# Patient Record
Sex: MALE | Race: WHITE | NOT HISPANIC OR LATINO | Employment: OTHER | URBAN - METROPOLITAN AREA
[De-identification: names, ages, dates, MRNs, and addresses within clinical notes are randomized per-mention and may not be internally consistent; named-entity substitution may affect disease eponyms.]

---

## 2017-02-15 ENCOUNTER — HOSPITAL ENCOUNTER (OUTPATIENT)
Dept: RADIOLOGY | Age: 67
Discharge: HOME | End: 2017-02-15
Payer: COMMERCIAL

## 2017-02-15 ENCOUNTER — TRANSCRIBE ORDERS (OUTPATIENT)
Dept: ADMINISTRATIVE | Age: 67
End: 2017-02-15

## 2017-02-15 ENCOUNTER — OFFICE VISIT (OUTPATIENT)
Dept: LAB | Age: 67
End: 2017-02-15
Payer: COMMERCIAL

## 2017-02-15 DIAGNOSIS — Z01.818 PRE-OPERATIVE GENERAL PHYSICAL EXAMINATION: Primary | ICD-10-CM

## 2017-02-15 DIAGNOSIS — Z01.818 PRE-OPERATIVE GENERAL PHYSICAL EXAMINATION: ICD-10-CM

## 2017-02-15 DIAGNOSIS — J18.9 UNRESOLVED PNEUMONIA: ICD-10-CM

## 2017-02-15 DIAGNOSIS — J18.9 UNRESOLVED PNEUMONIA: Primary | ICD-10-CM

## 2017-02-15 LAB
ATRIAL RATE: 211 BPM
QRS AXIS: 131 DEGREES
QRSD INTERVAL: 94 MS
QT INTERVAL: 202 MS
QTC INTERVAL: 378 MS
T WAVE AXIS: 127 DEGREES
VENTRICULAR RATE: 211 BPM

## 2017-02-15 PROCEDURE — 93005 ELECTROCARDIOGRAM TRACING: CPT

## 2017-02-15 PROCEDURE — 71020 HB CHEST X-RAY 2VW FRONTAL&LATL: CPT

## 2017-10-06 ENCOUNTER — APPOINTMENT (OUTPATIENT)
Dept: AUDIOLOGY | Age: 67
End: 2017-10-06
Payer: COMMERCIAL

## 2017-10-06 PROCEDURE — 92555 SPEECH THRESHOLD AUDIOMETRY: CPT | Performed by: AUDIOLOGIST

## 2017-10-06 PROCEDURE — 92567 TYMPANOMETRY: CPT | Performed by: AUDIOLOGIST

## 2017-10-06 PROCEDURE — 92582 CONDITIONING PLAY AUDIOMETRY: CPT | Performed by: AUDIOLOGIST

## 2018-01-18 ENCOUNTER — HOSPITAL ENCOUNTER (INPATIENT)
Facility: HOSPITAL | Age: 68
LOS: 3 days | Discharge: HOME HEALTH | DRG: 243 | End: 2018-01-21
Attending: INTERNAL MEDICINE | Admitting: INTERNAL MEDICINE
Payer: COMMERCIAL

## 2018-01-18 ENCOUNTER — APPOINTMENT (EMERGENCY)
Dept: RADIOLOGY | Facility: HOSPITAL | Age: 68
End: 2018-01-18
Payer: COMMERCIAL

## 2018-01-18 ENCOUNTER — HOSPITAL ENCOUNTER (EMERGENCY)
Facility: HOSPITAL | Age: 68
End: 2018-01-18
Admitting: EMERGENCY MEDICINE
Payer: COMMERCIAL

## 2018-01-18 VITALS
RESPIRATION RATE: 18 BRPM | OXYGEN SATURATION: 99 % | TEMPERATURE: 97.5 F | SYSTOLIC BLOOD PRESSURE: 135 MMHG | WEIGHT: 121 LBS | BODY MASS INDEX: 23.63 KG/M2 | DIASTOLIC BLOOD PRESSURE: 79 MMHG | HEART RATE: 28 BPM

## 2018-01-18 DIAGNOSIS — I44.2 THIRD DEGREE HEART BLOCK (HCC): Primary | ICD-10-CM

## 2018-01-18 DIAGNOSIS — S20.212A CHEST WALL CONTUSION, LEFT, INITIAL ENCOUNTER: ICD-10-CM

## 2018-01-18 DIAGNOSIS — R55 NEAR SYNCOPE: ICD-10-CM

## 2018-01-18 DIAGNOSIS — R53.1 ASTHENIA: ICD-10-CM

## 2018-01-18 LAB
ANION GAP SERPL CALCULATED.3IONS-SCNC: 7 MMOL/L (ref 4–13)
APTT PPP: 30 SECONDS (ref 23–35)
BASOPHILS # BLD AUTO: 0.03 THOUSANDS/ΜL (ref 0–0.1)
BASOPHILS NFR BLD AUTO: 0 % (ref 0–1)
BUN SERPL-MCNC: 28 MG/DL (ref 5–25)
CALCIUM SERPL-MCNC: 9 MG/DL (ref 8.3–10.1)
CHLORIDE SERPL-SCNC: 107 MMOL/L (ref 100–108)
CO2 SERPL-SCNC: 31 MMOL/L (ref 21–32)
CREAT SERPL-MCNC: 1.4 MG/DL (ref 0.6–1.3)
EOSINOPHIL # BLD AUTO: 0.34 THOUSAND/ΜL (ref 0–0.61)
EOSINOPHIL NFR BLD AUTO: 5 % (ref 0–6)
ERYTHROCYTE [DISTWIDTH] IN BLOOD BY AUTOMATED COUNT: 12.6 % (ref 11.6–15.1)
GFR SERPL CREATININE-BSD FRML MDRD: 52 ML/MIN/1.73SQ M
GLUCOSE SERPL-MCNC: 95 MG/DL (ref 65–140)
HCT VFR BLD AUTO: 45.5 % (ref 36.5–49.3)
HGB BLD-MCNC: 15.1 G/DL (ref 12–17)
INR PPP: 1.01 (ref 0.86–1.16)
LYMPHOCYTES # BLD AUTO: 3.58 THOUSANDS/ΜL (ref 0.6–4.47)
LYMPHOCYTES NFR BLD AUTO: 49 % (ref 14–44)
MAGNESIUM SERPL-MCNC: 2.1 MG/DL (ref 1.6–2.6)
MCH RBC QN AUTO: 30.6 PG (ref 26.8–34.3)
MCHC RBC AUTO-ENTMCNC: 33.2 G/DL (ref 31.4–37.4)
MCV RBC AUTO: 92 FL (ref 82–98)
MONOCYTES # BLD AUTO: 0.61 THOUSAND/ΜL (ref 0.17–1.22)
MONOCYTES NFR BLD AUTO: 9 % (ref 4–12)
NEUTROPHILS # BLD AUTO: 2.62 THOUSANDS/ΜL (ref 1.85–7.62)
NEUTS SEG NFR BLD AUTO: 37 % (ref 43–75)
PLATELET # BLD AUTO: 204 THOUSANDS/UL (ref 149–390)
PMV BLD AUTO: 9.3 FL (ref 8.9–12.7)
POTASSIUM SERPL-SCNC: 4.2 MMOL/L (ref 3.5–5.3)
PROTHROMBIN TIME: 13.6 SECONDS (ref 12.1–14.4)
RBC # BLD AUTO: 4.93 MILLION/UL (ref 3.88–5.62)
SODIUM SERPL-SCNC: 145 MMOL/L (ref 136–145)
TROPONIN I SERPL-MCNC: <0.02 NG/ML
TSH SERPL DL<=0.05 MIU/L-ACNC: 3.92 UIU/ML (ref 0.36–3.74)
WBC # BLD AUTO: 7.18 THOUSAND/UL (ref 4.31–10.16)

## 2018-01-18 PROCEDURE — 80048 BASIC METABOLIC PNL TOTAL CA: CPT | Performed by: PHYSICIAN ASSISTANT

## 2018-01-18 PROCEDURE — 84484 ASSAY OF TROPONIN QUANT: CPT | Performed by: PHYSICIAN ASSISTANT

## 2018-01-18 PROCEDURE — 83735 ASSAY OF MAGNESIUM: CPT | Performed by: PHYSICIAN ASSISTANT

## 2018-01-18 PROCEDURE — 84443 ASSAY THYROID STIM HORMONE: CPT | Performed by: PHYSICIAN ASSISTANT

## 2018-01-18 PROCEDURE — 85025 COMPLETE CBC W/AUTO DIFF WBC: CPT | Performed by: PHYSICIAN ASSISTANT

## 2018-01-18 PROCEDURE — 85730 THROMBOPLASTIN TIME PARTIAL: CPT | Performed by: PHYSICIAN ASSISTANT

## 2018-01-18 PROCEDURE — 93005 ELECTROCARDIOGRAM TRACING: CPT

## 2018-01-18 PROCEDURE — 85610 PROTHROMBIN TIME: CPT | Performed by: PHYSICIAN ASSISTANT

## 2018-01-18 PROCEDURE — 93005 ELECTROCARDIOGRAM TRACING: CPT | Performed by: PHYSICIAN ASSISTANT

## 2018-01-18 PROCEDURE — 96360 HYDRATION IV INFUSION INIT: CPT

## 2018-01-18 PROCEDURE — 36415 COLL VENOUS BLD VENIPUNCTURE: CPT | Performed by: PHYSICIAN ASSISTANT

## 2018-01-18 PROCEDURE — 99285 EMERGENCY DEPT VISIT HI MDM: CPT

## 2018-01-18 PROCEDURE — 71045 X-RAY EXAM CHEST 1 VIEW: CPT

## 2018-01-18 RX ORDER — CITALOPRAM 20 MG/1
20 TABLET ORAL DAILY
COMMUNITY

## 2018-01-18 RX ORDER — ACETAMINOPHEN 325 MG/1
325 TABLET ORAL EVERY 6 HOURS PRN
COMMUNITY

## 2018-01-18 RX ORDER — CETIRIZINE HYDROCHLORIDE 10 MG/1
10 TABLET ORAL DAILY
COMMUNITY
End: 2022-01-05

## 2018-01-18 RX ORDER — CHLORHEXIDINE GLUCONATE 0.12 MG/ML
15 RINSE ORAL EVERY 12 HOURS SCHEDULED
Status: DISCONTINUED | OUTPATIENT
Start: 2018-01-19 | End: 2018-01-19

## 2018-01-18 RX ORDER — RISPERIDONE 1 MG/1
1 TABLET, FILM COATED ORAL 2 TIMES DAILY
Status: DISCONTINUED | OUTPATIENT
Start: 2018-01-19 | End: 2018-01-21 | Stop reason: HOSPADM

## 2018-01-18 RX ORDER — CITALOPRAM 10 MG/1
10 TABLET ORAL DAILY
Status: DISCONTINUED | OUTPATIENT
Start: 2018-01-19 | End: 2018-01-21 | Stop reason: HOSPADM

## 2018-01-18 RX ORDER — TERAZOSIN 5 MG/1
5 CAPSULE ORAL
COMMUNITY

## 2018-01-18 RX ORDER — OXYBUTYNIN CHLORIDE 5 MG/1
5 TABLET ORAL 2 TIMES DAILY
COMMUNITY

## 2018-01-18 RX ORDER — CALCIUM POLYCARBOPHIL 625 MG 625 MG/1
625 TABLET ORAL DAILY
COMMUNITY

## 2018-01-18 RX ORDER — LOPERAMIDE HYDROCHLORIDE 2 MG/1
TABLET ORAL 4 TIMES DAILY PRN
COMMUNITY

## 2018-01-18 RX ORDER — RISPERIDONE 1 MG/1
0.5 TABLET, FILM COATED ORAL 2 TIMES DAILY
COMMUNITY

## 2018-01-18 RX ADMIN — SODIUM CHLORIDE 500 ML: 0.9 INJECTION, SOLUTION INTRAVENOUS at 21:14

## 2018-01-19 ENCOUNTER — APPOINTMENT (INPATIENT)
Dept: NON INVASIVE DIAGNOSTICS | Facility: HOSPITAL | Age: 68
DRG: 243 | End: 2018-01-19
Payer: COMMERCIAL

## 2018-01-19 ENCOUNTER — APPOINTMENT (INPATIENT)
Dept: RADIOLOGY | Facility: HOSPITAL | Age: 68
DRG: 243 | End: 2018-01-19
Payer: COMMERCIAL

## 2018-01-19 PROBLEM — F79 MR (MENTAL RETARDATION): Status: ACTIVE | Noted: 2018-01-19

## 2018-01-19 PROBLEM — S20.219A CHEST WALL CONTUSION: Status: ACTIVE | Noted: 2018-01-19

## 2018-01-19 LAB
ABO GROUP BLD: NORMAL
ANION GAP SERPL CALCULATED.3IONS-SCNC: 8 MMOL/L (ref 4–13)
APTT PPP: 32 SECONDS (ref 23–35)
ATRIAL RATE: 37 BPM
ATRIAL RATE: 47 BPM
BASOPHILS # BLD AUTO: 0.02 THOUSANDS/ΜL (ref 0–0.1)
BASOPHILS NFR BLD AUTO: 0 % (ref 0–1)
BLD GP AB SCN SERPL QL: NEGATIVE
BUN SERPL-MCNC: 22 MG/DL (ref 5–25)
CALCIUM SERPL-MCNC: 8.8 MG/DL (ref 8.3–10.1)
CHLORIDE SERPL-SCNC: 108 MMOL/L (ref 100–108)
CO2 SERPL-SCNC: 25 MMOL/L (ref 21–32)
CREAT SERPL-MCNC: 1.05 MG/DL (ref 0.6–1.3)
EOSINOPHIL # BLD AUTO: 0.23 THOUSAND/ΜL (ref 0–0.61)
EOSINOPHIL NFR BLD AUTO: 3 % (ref 0–6)
ERYTHROCYTE [DISTWIDTH] IN BLOOD BY AUTOMATED COUNT: 12.6 % (ref 11.6–15.1)
GFR SERPL CREATININE-BSD FRML MDRD: 74 ML/MIN/1.73SQ M
GLUCOSE SERPL-MCNC: 85 MG/DL (ref 65–140)
HCT VFR BLD AUTO: 44.6 % (ref 36.5–49.3)
HGB BLD-MCNC: 15.6 G/DL (ref 12–17)
INR PPP: 1.12 (ref 0.86–1.16)
LYMPHOCYTES # BLD AUTO: 2.49 THOUSANDS/ΜL (ref 0.6–4.47)
LYMPHOCYTES NFR BLD AUTO: 33 % (ref 14–44)
MAGNESIUM SERPL-MCNC: 2.2 MG/DL (ref 1.6–2.6)
MCH RBC QN AUTO: 31 PG (ref 26.8–34.3)
MCHC RBC AUTO-ENTMCNC: 35 G/DL (ref 31.4–37.4)
MCV RBC AUTO: 89 FL (ref 82–98)
MONOCYTES # BLD AUTO: 0.67 THOUSAND/ΜL (ref 0.17–1.22)
MONOCYTES NFR BLD AUTO: 9 % (ref 4–12)
NEUTROPHILS # BLD AUTO: 4.24 THOUSANDS/ΜL (ref 1.85–7.62)
NEUTS SEG NFR BLD AUTO: 55 % (ref 43–75)
NRBC BLD AUTO-RTO: 0 /100 WBCS
PHOSPHATE SERPL-MCNC: 3.4 MG/DL (ref 2.3–4.1)
PLATELET # BLD AUTO: 207 THOUSANDS/UL (ref 149–390)
PMV BLD AUTO: 9.6 FL (ref 8.9–12.7)
POTASSIUM SERPL-SCNC: 3.8 MMOL/L (ref 3.5–5.3)
PROTHROMBIN TIME: 14.4 SECONDS (ref 12.1–14.4)
QRS AXIS: 140 DEGREES
QRS AXIS: 64 DEGREES
QRSD INTERVAL: 120 MS
QRSD INTERVAL: 133 MS
QT INTERVAL: 658 MS
QT INTERVAL: 706 MS
QTC INTERVAL: 517 MS
QTC INTERVAL: 554 MS
RBC # BLD AUTO: 5.03 MILLION/UL (ref 3.88–5.62)
RH BLD: POSITIVE
SODIUM SERPL-SCNC: 141 MMOL/L (ref 136–145)
SPECIMEN EXPIRATION DATE: NORMAL
T WAVE AXIS: 43 DEGREES
T WAVE AXIS: 65 DEGREES
VENTRICULAR RATE: 37 BPM
VENTRICULAR RATE: 37 BPM
WBC # BLD AUTO: 7.66 THOUSAND/UL (ref 4.31–10.16)

## 2018-01-19 PROCEDURE — 80048 BASIC METABOLIC PNL TOTAL CA: CPT | Performed by: PHYSICIAN ASSISTANT

## 2018-01-19 PROCEDURE — C1769 GUIDE WIRE: HCPCS | Performed by: INTERNAL MEDICINE

## 2018-01-19 PROCEDURE — 93005 ELECTROCARDIOGRAM TRACING: CPT

## 2018-01-19 PROCEDURE — C1785 PMKR, DUAL, RATE-RESP: HCPCS

## 2018-01-19 PROCEDURE — 85730 THROMBOPLASTIN TIME PARTIAL: CPT | Performed by: PHYSICIAN ASSISTANT

## 2018-01-19 PROCEDURE — 93306 TTE W/DOPPLER COMPLETE: CPT

## 2018-01-19 PROCEDURE — 33208 INSRT HEART PM ATRIAL & VENT: CPT | Performed by: INTERNAL MEDICINE

## 2018-01-19 PROCEDURE — 02HK3JZ INSERTION OF PACEMAKER LEAD INTO RIGHT VENTRICLE, PERCUTANEOUS APPROACH: ICD-10-PCS | Performed by: INTERNAL MEDICINE

## 2018-01-19 PROCEDURE — 85025 COMPLETE CBC W/AUTO DIFF WBC: CPT | Performed by: PHYSICIAN ASSISTANT

## 2018-01-19 PROCEDURE — 83735 ASSAY OF MAGNESIUM: CPT | Performed by: PHYSICIAN ASSISTANT

## 2018-01-19 PROCEDURE — 86850 RBC ANTIBODY SCREEN: CPT | Performed by: PHYSICIAN ASSISTANT

## 2018-01-19 PROCEDURE — C1898 LEAD, PMKR, OTHER THAN TRANS: HCPCS

## 2018-01-19 PROCEDURE — 02H63JZ INSERTION OF PACEMAKER LEAD INTO RIGHT ATRIUM, PERCUTANEOUS APPROACH: ICD-10-PCS | Performed by: INTERNAL MEDICINE

## 2018-01-19 PROCEDURE — 85610 PROTHROMBIN TIME: CPT | Performed by: PHYSICIAN ASSISTANT

## 2018-01-19 PROCEDURE — 86618 LYME DISEASE ANTIBODY: CPT | Performed by: PHYSICIAN ASSISTANT

## 2018-01-19 PROCEDURE — 71045 X-RAY EXAM CHEST 1 VIEW: CPT

## 2018-01-19 PROCEDURE — 84100 ASSAY OF PHOSPHORUS: CPT | Performed by: PHYSICIAN ASSISTANT

## 2018-01-19 PROCEDURE — C1892 INTRO/SHEATH,FIXED,PEEL-AWAY: HCPCS | Performed by: INTERNAL MEDICINE

## 2018-01-19 PROCEDURE — 86900 BLOOD TYPING SEROLOGIC ABO: CPT | Performed by: PHYSICIAN ASSISTANT

## 2018-01-19 PROCEDURE — 86901 BLOOD TYPING SEROLOGIC RH(D): CPT | Performed by: PHYSICIAN ASSISTANT

## 2018-01-19 PROCEDURE — 0JH606Z INSERTION OF PACEMAKER, DUAL CHAMBER INTO CHEST SUBCUTANEOUS TISSUE AND FASCIA, OPEN APPROACH: ICD-10-PCS | Performed by: INTERNAL MEDICINE

## 2018-01-19 PROCEDURE — 86617 LYME DISEASE ANTIBODY: CPT | Performed by: PHYSICIAN ASSISTANT

## 2018-01-19 RX ORDER — FENTANYL CITRATE 50 UG/ML
INJECTION, SOLUTION INTRAMUSCULAR; INTRAVENOUS AS NEEDED
Status: DISCONTINUED | OUTPATIENT
Start: 2018-01-19 | End: 2018-01-19 | Stop reason: SURG

## 2018-01-19 RX ORDER — FENTANYL CITRATE/PF 50 MCG/ML
25 SYRINGE (ML) INJECTION
Status: DISCONTINUED | OUTPATIENT
Start: 2018-01-19 | End: 2018-01-20

## 2018-01-19 RX ORDER — METOCLOPRAMIDE HYDROCHLORIDE 5 MG/ML
10 INJECTION INTRAMUSCULAR; INTRAVENOUS ONCE AS NEEDED
Status: DISCONTINUED | OUTPATIENT
Start: 2018-01-19 | End: 2018-01-21 | Stop reason: HOSPADM

## 2018-01-19 RX ORDER — ONDANSETRON 2 MG/ML
4 INJECTION INTRAMUSCULAR; INTRAVENOUS ONCE AS NEEDED
Status: DISCONTINUED | OUTPATIENT
Start: 2018-01-19 | End: 2018-01-20

## 2018-01-19 RX ORDER — ACETAMINOPHEN 325 MG/1
650 TABLET ORAL EVERY 4 HOURS PRN
Status: DISCONTINUED | OUTPATIENT
Start: 2018-01-19 | End: 2018-01-19

## 2018-01-19 RX ORDER — POTASSIUM CHLORIDE 14.9 MG/ML
20 INJECTION INTRAVENOUS ONCE
Status: COMPLETED | OUTPATIENT
Start: 2018-01-19 | End: 2018-01-19

## 2018-01-19 RX ORDER — SODIUM CHLORIDE 9 MG/ML
INJECTION, SOLUTION INTRAVENOUS CONTINUOUS PRN
Status: DISCONTINUED | OUTPATIENT
Start: 2018-01-19 | End: 2018-01-19 | Stop reason: SURG

## 2018-01-19 RX ORDER — LIDOCAINE HYDROCHLORIDE 10 MG/ML
INJECTION, SOLUTION INFILTRATION; PERINEURAL CODE/TRAUMA/SEDATION MEDICATION
Status: COMPLETED | OUTPATIENT
Start: 2018-01-19 | End: 2018-01-19

## 2018-01-19 RX ORDER — OXYCODONE HYDROCHLORIDE AND ACETAMINOPHEN 5; 325 MG/1; MG/1
1 TABLET ORAL EVERY 4 HOURS PRN
Status: DISCONTINUED | OUTPATIENT
Start: 2018-01-19 | End: 2018-01-21 | Stop reason: HOSPADM

## 2018-01-19 RX ORDER — GLYCOPYRROLATE 0.2 MG/ML
INJECTION INTRAMUSCULAR; INTRAVENOUS AS NEEDED
Status: DISCONTINUED | OUTPATIENT
Start: 2018-01-19 | End: 2018-01-19 | Stop reason: SURG

## 2018-01-19 RX ORDER — SODIUM CHLORIDE, SODIUM LACTATE, POTASSIUM CHLORIDE, CALCIUM CHLORIDE 600; 310; 30; 20 MG/100ML; MG/100ML; MG/100ML; MG/100ML
100 INJECTION, SOLUTION INTRAVENOUS CONTINUOUS
Status: DISCONTINUED | OUTPATIENT
Start: 2018-01-19 | End: 2018-01-19

## 2018-01-19 RX ORDER — PROPOFOL 10 MG/ML
INJECTION, EMULSION INTRAVENOUS AS NEEDED
Status: DISCONTINUED | OUTPATIENT
Start: 2018-01-19 | End: 2018-01-19

## 2018-01-19 RX ORDER — PROMETHAZINE HYDROCHLORIDE 25 MG/ML
12.5 INJECTION, SOLUTION INTRAMUSCULAR; INTRAVENOUS ONCE AS NEEDED
Status: DISCONTINUED | OUTPATIENT
Start: 2018-01-19 | End: 2018-01-20

## 2018-01-19 RX ORDER — MIDAZOLAM HYDROCHLORIDE 1 MG/ML
INJECTION INTRAMUSCULAR; INTRAVENOUS AS NEEDED
Status: DISCONTINUED | OUTPATIENT
Start: 2018-01-19 | End: 2018-01-19 | Stop reason: SURG

## 2018-01-19 RX ORDER — POTASSIUM CHLORIDE 20 MEQ/1
20 TABLET, EXTENDED RELEASE ORAL ONCE
Status: COMPLETED | OUTPATIENT
Start: 2018-01-19 | End: 2018-01-19

## 2018-01-19 RX ORDER — ACETAMINOPHEN 325 MG/1
650 TABLET ORAL EVERY 6 HOURS PRN
Status: DISCONTINUED | OUTPATIENT
Start: 2018-01-19 | End: 2018-01-21 | Stop reason: HOSPADM

## 2018-01-19 RX ORDER — ACETAMINOPHEN 325 MG/1
650 TABLET ORAL EVERY 6 HOURS PRN
Status: DISCONTINUED | OUTPATIENT
Start: 2018-01-19 | End: 2018-01-19

## 2018-01-19 RX ORDER — POTASSIUM CHLORIDE 29.8 MG/ML
40 INJECTION INTRAVENOUS ONCE
Status: DISCONTINUED | OUTPATIENT
Start: 2018-01-19 | End: 2018-01-19

## 2018-01-19 RX ORDER — METOPROLOL TARTRATE 5 MG/5ML
INJECTION INTRAVENOUS AS NEEDED
Status: DISCONTINUED | OUTPATIENT
Start: 2018-01-19 | End: 2018-01-19 | Stop reason: SURG

## 2018-01-19 RX ORDER — GENTAMICIN SULFATE 40 MG/ML
INJECTION, SOLUTION INTRAMUSCULAR; INTRAVENOUS CODE/TRAUMA/SEDATION MEDICATION
Status: COMPLETED | OUTPATIENT
Start: 2018-01-19 | End: 2018-01-19

## 2018-01-19 RX ADMIN — RISPERIDONE 1 MG: 1 TABLET ORAL at 17:42

## 2018-01-19 RX ADMIN — METOPROLOL TARTRATE 3 MG: 1 INJECTION, SOLUTION INTRAVENOUS at 20:03

## 2018-01-19 RX ADMIN — METOPROLOL TARTRATE 2 MG: 1 INJECTION, SOLUTION INTRAVENOUS at 19:55

## 2018-01-19 RX ADMIN — POTASSIUM CHLORIDE 20 MEQ: 1500 TABLET, EXTENDED RELEASE ORAL at 11:16

## 2018-01-19 RX ADMIN — FENTANYL CITRATE 50 MCG: 50 INJECTION, SOLUTION INTRAMUSCULAR; INTRAVENOUS at 19:06

## 2018-01-19 RX ADMIN — ACETAMINOPHEN 650 MG: 325 TABLET, FILM COATED ORAL at 14:48

## 2018-01-19 RX ADMIN — GLYCOPYRROLATE 0.2 MG: 0.2 INJECTION, SOLUTION INTRAMUSCULAR; INTRAVENOUS at 19:23

## 2018-01-19 RX ADMIN — LIDOCAINE HYDROCHLORIDE 20 ML: 10 INJECTION, SOLUTION INFILTRATION; PERINEURAL at 19:19

## 2018-01-19 RX ADMIN — RISPERIDONE 1 MG: 1 TABLET ORAL at 08:05

## 2018-01-19 RX ADMIN — MIDAZOLAM HYDROCHLORIDE 2 MG: 1 INJECTION, SOLUTION INTRAMUSCULAR; INTRAVENOUS at 18:58

## 2018-01-19 RX ADMIN — SODIUM CHLORIDE: 0.9 INJECTION, SOLUTION INTRAVENOUS at 18:29

## 2018-01-19 RX ADMIN — CEFAZOLIN SODIUM 1000 MG: 1 SOLUTION INTRAVENOUS at 19:06

## 2018-01-19 RX ADMIN — CHLORHEXIDINE GLUCONATE 15 ML: 1.2 RINSE ORAL at 20:41

## 2018-01-19 RX ADMIN — POTASSIUM CHLORIDE 20 MEQ: 200 INJECTION, SOLUTION INTRAVENOUS at 11:16

## 2018-01-19 RX ADMIN — CITALOPRAM HYDROBROMIDE 10 MG: 10 TABLET ORAL at 08:05

## 2018-01-19 RX ADMIN — CHLORHEXIDINE GLUCONATE 15 ML: 1.2 RINSE ORAL at 08:06

## 2018-01-19 RX ADMIN — GENTAMICIN SULFATE 80 MG: 40 INJECTION, SOLUTION INTRAMUSCULAR; INTRAVENOUS at 19:40

## 2018-01-19 RX ADMIN — ENOXAPARIN SODIUM 40 MG: 40 INJECTION SUBCUTANEOUS at 20:41

## 2018-01-19 NOTE — CASE MANAGEMENT
Thank you,  [facility] in the [facility] by Chris Mendoza for 2017  Network Utilization Review Department  Phone: [phone]; Fax [phone]  ATTENTION: The Network Utilization Review Department is now centralized for our 7 Facilities  Make a note that we have a new phone and fax numbers for our Department  Please call with any questions or concerns to [phone] and carefully follow the prompts so that you are directed to the right person  All voicemails are confidential  Fax any determinations, approvals, denials, and requests for initial or continue stay review clinical to [phone]  Due to HIGH CALL volume, it would be easier if you could please send faxed requests to expedite your requests and in part, help us provide discharge notifications faster  Initial Clinical Review    Admission: Date/Time/Statement: 1/18/18 AT 2349 URGENT INPATIENT TRANSFER FROM [facility] TO [facility] 2ND FALL WITH BRADYCARDIA 30'S /COMPLETE HEART BLOCK WITH JUNCTIONAL ESCAPE RHYTHYM FOR CARDIAC MANAGEMENT + PACEMAKER INSERTION      Orders Placed This Encounter   Procedures    Inpatient Admission     Standing Status:   Standing     Number of Occurrences:   1     Order Specific Question:   Admitting Physician     Answer:   Veverly Good [[id number]]     Order Specific Question:   Level of Care     Answer:   Critical Care [15]     Order Specific Question:   Estimated length of stay     Answer:   More than 2 Midnights     Order Specific Question:   Certification     Answer:   I certify that inpatient services are medically necessary for this patient for a duration of greater than two midnights  See H&P and MD Progress Notes for additional information about the patient's course of treatment         Date/Time/Mode of Arrival:     Reason for Admission / Chief Complaint: 3rd Degree heart block     History of Present Illness:  Modesta Cooper is a 78 y o  male who presents with his sister, transferred from [facility] ED for 3rd degree heart block noted upon evaluation s/p unwitnessed fall earlier today  PMHx is most significant for MR/ developmental delay, otherwise as stated below      Patients sister states that he was in his normal state of health, at his group home, and was in the shower by himself (normally takes a shower on his own) and fell  It was unknown whether it was syncope (LOC?) or a mechanical fall but patient was conscious and acting appropriately after the incident       His sister states that he falls frequently and she thought that this incident was pretty standard for him but when he was evaluated in the ED and she was told that he had a very low heart rate, she was shocked and states that this has never been brought to her attention before      No new medications, no known exposures to ticks       Patient was evaluated in the ED at [facility] and was transferred to [facility] for cardiology evaluation and pacemaker placement  Throughout his ED stay and upon arrival to the ICU, patient has been asymptomatic and at his baseline per patients sister      History obtained from sibling  Review of Systems   Unable to perform ROS: Patient nonverbal         Physical Exam:  Constitutional: He appears well-developed  No distress  Cardiovascular: Intact distal pulses  Bradycardia present  Pulmonary/Chest: Effort normal and breath sounds normal  No accessory muscle usage or stridor  No respiratory distress  He has no decreased breath sounds  He has no wheezes  He has no rhonchi  He has no rales         ED Vital Signs:   ED Triage Vitals [01/18/18 2300]   Temperature Pulse Respirations Blood Pressure SpO2   98 1 °F (36 7 °C) (!) 31 20 154/78 100 %      Temp Source Heart Rate Source Patient Position - Orthostatic VS BP Location FiO2 (%)   Oral Monitor Lying Right arm --      Pain Score       --        Wt Readings from Last 1 Encounters:   01/19/18 54 5 kg (120 lb 2 4 oz) 01/18 0701  01/19 0700 01/19 0701  01/19 1435  Most Recent    Temperature (°F) 97 5-98 4 98 6  98 6 (37)    Pulse 26-39 22-40  22    Respirations 12-23 18-24  18    Blood Pressure 113//67 100//64  107/67    SpO2 (%)    99          LABS/Diagnostic Test Results:   CBC  Results from last 7 days  Lab Units 01/19/18  0519 01/18/18  2109   WBC Thousand/uL 7 66 7 18   HEMOGLOBIN g/dL 15 6 15 1   HEMATOCRIT % 44 6 45 5   PLATELETS Thousands/uL 207 204   NEUTROS PCT % 55 37*   MONOS PCT % 9 9     CMP  Results from last 7 days  Lab Units 01/19/18  0519 01/18/18  2109   SODIUM mmol/L 141 145   POTASSIUM mmol/L 3 8 4 2   CHLORIDE mmol/L 108 107   CO2 mmol/L 25 31   BUN mg/dL 22 28*   CREATININE mg/dL 1 05 1 40*   CALCIUM mg/dL 8 8 9 0   GLUCOSE RANDOM mg/dL 85 95      Lab Units 01/19/18  0519 01/18/18  2109   MAGNESIUM mg/dL 2 2 2 1            Lab Results   Component Value Date     PHOS 3 4 01/19/2018      Lab Units 01/19/18  0519 01/18/18 2109   INR   1 12 1 01   PTT seconds 32 30      Lab Value Date/Time   TROPONINI <0 02 01/18/2018 2109       EKG (per Card Consult ):   Sinus tachycardia with junctional escape rhythm  RBBB at baseline      Past Medical/Surgical History:   Past Medical History:   Diagnosis Date    Developmental non-verbal disorder     Dysphagia     Gait disturbance     Hearing loss     Intellectual disability     Osteoarthritis     Osteoporosis     Prostate atrophy        Admitting Diagnosis: Third degree heart block (HCC) [I44 2]    Age/Sex: 78 y o  male    Medications  Medications   sodium chloride 0 9 % bolus 500 mL (0 mL Intravenous Stopped 1/18/18 2202)           Assessment and Plan:   Principal Problem: Third degree heart block (HCC)  Active Problems:    MR (mental retardation)    Chest wall contusion    Neuro: At baseline MR per sister  Cont neuro checks  [place]      CV: 3rd degree heart block  For pacemaker today    Pacer pads in place      Pulm: no acute pulm issues at this time     GI: npo  Advance diet after procedure     : monitor I/o's  Replace potassium     ID: no infxn at this time       Heme: stable at this time  Monitor        Endo: glucose control                Msk/Skin: monitor left cw contusion      Disposition: cont icu care    Ppm today          Admission Orders:  1?18/18 AT 2349  ADMIT INPATIENT TO ICU  CARDIO PULM MONITORING  VS PER ICU Q1HR  NEURO CHECKS Q2HRS  Up with Assistance  SCD    KEEP TRANSCUTANEOUS PACER PADS ATTACHED     NPO (for Card Cath Lab )      Continuous IV Infusions:    IVF sodium chloride 0 9 % bolus 500 mL BOLUS X 1      Scheduled Meds:   chlorhexidine 15 mL Swish & Spit Q12H [facility]   citalopram 10 mg Oral Daily   enoxaparin 40 mg Subcutaneous Q24H   risperiDONE 1 mg Oral BID     PRN Meds:      acetaminophen    CONSULT CARD       Cardiology  Consults Date of Service: 1/18/2018 11:27 PM     Assessment/ Plan:   Complete heart block  --->EKG revealing sinus tachycardia with junctional escape rhythm, RBBB at baseline  --->Is currently hemodynamically stable with SBP 150s, appears alert and comfortable   ---->Would plan for permanent pacemaker placement in the morning   ----> HR in 30s, would hold off on emergent transvenous pacer placement, as patient is at high risk of pulling the catheter out    --->  However if he becomes hemodynamically stable or symptomatic, would give dopamine vs place an emergent TVP possible with sedation and intubation, plan discussed with his sister in detail, she expressed understanding and agrees with the plan   ---->would keep the transcutaneous pacing pads attached   ---->obtain echocardiogram in the morning  - --->would monitor electrolytes to keep potassium 4-4 5, magnesium 2-2 5  ----->would hold off on starting any of his antipsychotics tonight   ---> Keep NPO past midnight     Mental retardation  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     Fall  Longstanding history of unstable gait and recurrent falls, following Neurology at [facility]

## 2018-01-19 NOTE — PROGRESS NOTES
Patient transported to cath lab on monitor and life pack, accompanied by his sister, Arlovebruce GLASER)

## 2018-01-19 NOTE — ED PROVIDER NOTES
History  Chief Complaint   Patient presents with    Fall     Pt presents to ED from home where pt fell getting out of shower injuring left ribs laterally, abrasion noted  Pt is non verbal and uses a wheelchair  Pt (-) LOC, (-) hitting head  Patient presents to the emergency room after falling in the shower at the group home  Patient has a history of MR  His caretaker has noticed that he has been falling a lot over the past 2 days  Patient is essentially non verbal but alert  He has not been recently ill  He injured his left posterior chest wall today so they brought him in for evaluation for the caretaker  Patient has a power-of- his sister Suzette   [phone]        History provided by:  Patient      Prior to Admission Medications   Prescriptions Last Dose Informant Patient Reported? Taking?    Calcium Carbonate-Vitamin D 600-400 MG-UNIT per chew tablet   Yes Yes   Sig: Chew 1 tablet 2 (two) times a day   Miconazole Nitrate 2 % AERP   Yes Yes   Sig: Apply topically   Starch-Maltodextrin (DIAFOODS THICK-IT PO)   Yes Yes   Sig: Take by mouth   acetaminophen (TYLENOL) 325 mg tablet   Yes Yes   Sig: Take 325 mg by mouth every 6 (six) hours as needed for mild pain   calcium polycarbophil (FIBERCON) 625 mg tablet   Yes Yes   Sig: Take 625 mg by mouth daily   cetirizine (ZyrTEC) 10 mg tablet   Yes Yes   Sig: Take 10 mg by mouth daily   citalopram (CeleXA) 20 mg tablet   Yes Yes   Sig: Take 10 mg by mouth daily   guaiFENesin (ROBITUSSIN) 100 MG/5ML oral liquid   Yes Yes   Sig: Take 200 mg by mouth 3 (three) times a day as needed for cough   loperamide (IMODIUM A-D) 2 MG tablet   Yes Yes   Sig: Take 2 mg by mouth 4 (four) times a day as needed for diarrhea   oxybutynin (DITROPAN) 5 mg tablet   Yes Yes   Sig: Take 5 mg by mouth 2 (two) times a day   risperiDONE (RisperDAL) 1 mg tablet   Yes Yes   Sig: Take 1 mg by mouth 2 (two) times a day   terazosin (HYTRIN) 5 mg capsule   Yes Yes   Sig: Take 5 mg by mouth daily at bedtime      Facility-Administered Medications: None       Past Medical History:   Diagnosis Date    Developmental non-verbal disorder     Dysphagia     moderate pharyngeal dysphagia    Gait disturbance     Hearing loss     Intellectual disability     Osteoarthritis     Osteoporosis     Prostate atrophy        History reviewed  No pertinent surgical history  History reviewed  No pertinent family history  I have reviewed and agree with the history as documented  Social History   Substance Use Topics    Smoking status: Never Smoker    Smokeless tobacco: Never Used    Alcohol use No        Review of Systems   Reason unable to perform ROS: MR    Constitutional: Positive for activity change  Cardiovascular: Positive for chest pain  Contusion left chest wall   Musculoskeletal: Negative for arthralgias and back pain  Skin: Positive for color change  Abrasion left posterior lower chest wall  Psychiatric/Behavioral: Negative for confusion  Physical Exam  ED Triage Vitals   Temperature Pulse Respirations Blood Pressure SpO2   01/18/18 2003 01/18/18 2003 01/18/18 2003 01/18/18 2009 01/18/18 2003   97 5 °F (36 4 °C) (!) 39 18 141/64 99 %      Temp src Heart Rate Source Patient Position - Orthostatic VS BP Location FiO2 (%)   -- 01/18/18 2003 01/18/18 2003 01/18/18 2003 --    Monitor Sitting Right arm       Pain Score       --                  Orthostatic Vital Signs  Vitals:    01/18/18 2130 01/18/18 2145 01/18/18 2200 01/18/18 2215   BP: 157/70 146/67  135/79   Pulse: (!) 26 (!) 30 (!) 26 (!) 28   Patient Position - Orthostatic VS:  Lying  Lying       Physical Exam   Constitutional: He appears well-developed and well-nourished  No distress  HENT:   Head: Normocephalic  Right Ear: External ear normal    Left Ear: External ear normal    Mouth/Throat: Oropharynx is clear and moist    Eyes: Conjunctivae are normal  Pupils are equal, round, and reactive to light  Right eye exhibits no discharge  Left eye exhibits no discharge  Neck: Neck supple  No JVD present  No tracheal deviation present  No thyromegaly present  Cardiovascular: Exam reveals no gallop and no friction rub  No murmur heard  Bradycardia-heart rate 27   Pulmonary/Chest: Effort normal and breath sounds normal  No stridor  No respiratory distress  He has no wheezes  He has no rales  He exhibits tenderness  Abdominal: Soft  He exhibits no distension  There is no tenderness  There is no rebound  No hernia  Musculoskeletal: He exhibits no edema or tenderness  Neurological: He is alert  Skin: Skin is warm  Capillary refill takes less than 2 seconds  He is not diaphoretic  Abrasion/contusion left lower posterior chest wall  Tender upon palpation  No crepitance  Nursing note and vitals reviewed  ED Medications  Medications   sodium chloride 0 9 % bolus 500 mL (0 mL Intravenous Stopped 1/18/18 2202)       Diagnostic Studies  Results Reviewed     Procedure Component Value Units Date/Time    Basic metabolic panel [[id number]]  (Abnormal) Collected:  01/18/18 2109    Lab Status:  Final result Specimen:  Blood from Arm, Right Updated:  01/18/18 2138     Sodium 145 mmol/L      Potassium 4 2 mmol/L      Chloride 107 mmol/L      CO2 31 mmol/L      Anion Gap 7 mmol/L      BUN 28 (H) mg/dL      Creatinine 1 40 (H) mg/dL      Glucose 95 mg/dL      Calcium 9 0 mg/dL      eGFR 52 ml/min/1 73sq m     Narrative:         National Kidney Disease Education Program recommendations are as follows:  GFR calculation is accurate only with a steady state creatinine  Chronic Kidney disease less than 60 ml/min/1 73 sq  meters  Kidney failure less than 15 ml/min/1 73 sq  meters      Magnesium [[id number]]  (Normal) Collected:  01/18/18 2109    Lab Status:  Final result Specimen:  Blood from Arm, Right Updated:  01/18/18 2138     Magnesium 2 1 mg/dL     TSH [[id number]]  (Abnormal) Collected:  01/18/18 2109    Lab Status: Final result Specimen:  Blood from Arm, Right Updated:  01/18/18 2138     TSH 3RD GENERATON 3 919 (H) uIU/mL     Narrative:         Patients undergoing fluorescein dye angiography may retain small amounts of fluorescein in the body for 48-72 hours post procedure  Samples containing fluorescein can produce falsely depressed TSH values  If the patient had this procedure,a specimen should be resubmitted post fluorescein clearance  Troponin I [[id number]]  (Normal) Collected:  01/18/18 2109    Lab Status:  Final result Specimen:  Blood from Arm, Right Updated:  01/18/18 2133     Troponin I <0 02 ng/mL     Narrative:         [employer] Chemistry analyzer 99% cutoff is > 0 04 ng/mL in network labs    o cTnI 99% cutoff is useful only when applied to patients in the clinical setting of myocardial ischemia  o cTnI 99% cutoff should be interpreted in the context of clinical history, ECG findings and possibly cardiac imaging to establish correct diagnosis  o cTnI 99% cutoff may be suggestive but clearly not indicative of a coronary event without the clinical setting of myocardial ischemia  J Luisbrissa Conwayu [[id number]]  (Normal) Collected:  01/18/18 2109    Lab Status:  Final result Specimen:  Blood from Arm, Right Updated:  01/18/18 2126     Protime 13 6 seconds      INR 1 01    APTT [[id number]]  (Normal) Collected:  01/18/18 2109    Lab Status:  Final result Specimen:  Blood from Arm, Right Updated:  01/18/18 2126     PTT 30 seconds     Narrative:          Therapeutic Heparin Range = 60-90 seconds    CBC and differential [[id number]]  (Abnormal) Collected:  01/18/18 2109    Lab Status:  Final result Specimen:  Blood from Arm, Right Updated:  01/18/18 2116     WBC 7 18 Thousand/uL      RBC 4 93 Million/uL      Hemoglobin 15 1 g/dL      Hematocrit 45 5 %      MCV 92 fL      MCH 30 6 pg      MCHC 33 2 g/dL      RDW 12 6 %      MPV 9 3 fL      Platelets 269 Thousands/uL      Neutrophils Relative 37 (L) %      Lymphocytes Relative 49 (H) %      Monocytes Relative 9 %      Eosinophils Relative 5 %      Basophils Relative 0 %      Neutrophils Absolute 2 62 Thousands/µL      Lymphocytes Absolute 3 58 Thousands/µL      Monocytes Absolute 0 61 Thousand/µL      Eosinophils Absolute 0 34 Thousand/µL      Basophils Absolute 0 03 Thousands/µL                  XR chest 1 view portable    (Results Pending)              Procedures  ECG 12 Lead Documentation  Date/Time: 1/18/2018 9:01 PM  Performed by: Alyssa Martinez  Authorized by: Alyssa Martinez     Indications / Diagnosis:  Near syncope  ECG reviewed by me, the ED Provider: yes    Patient location:  ED  Previous ECG:     Previous ECG:  Compared to current    Similarity:  Changes noted    Comparison to cardiac monitor: Yes    Interpretation:     Interpretation: abnormal    Rate:     ECG rate:  37    ECG rate assessment: bradycardic    Rhythm:     Rhythm: sinus bradycardia    Ectopy:     Ectopy: none    Conduction:     Conduction: abnormal      Abnormal conduction: incomplete RBBB and 3rd degree    ST segments:     ST segments:  Non-specific  T waves:     T waves: non-specific             Phone Contacts  ED Phone Contact    ED Course  ED Course as of Jan 18 2324   Thu Jan 18, 2018 2224 I spoke to Dr Anny Matthews from Cardiology at 21:10  He is aware of the third degree heart block  Patient has a history of MR  I spoke to his Sister, [place], who agreed that her Brother would not lay still for a procedure for a temporary wire  Cardiologist also spoke to her  Plan is to send him to [facility] under Dr Raomn Betts service in the ICU and permanent pacemaker planned for the morning                                    MDM  Number of Diagnoses or Management Options  Asthenia: new and requires workup  Chest wall contusion, left, initial encounter: new and requires workup  Near syncope: new and requires workup  Third degree heart block [facility]): new and requires workup     Amount and/or Complexity of Data Reviewed  Clinical lab tests: ordered and reviewed  Tests in the radiology section of CPT®: ordered and reviewed  Tests in the medicine section of CPT®: ordered and reviewed  Independent visualization of images, tracings, or specimens: (Portable chest-no acute cardiopulmonary disease  No pneumothorax  No evidence of a rib fracture )    Risk of Complications, Morbidity, and/or Mortality  Presenting problems: high  Diagnostic procedures: high  Management options: high  General comments: Patient presents emergency room for evaluation after falling in the shower and injuring his left posterior lower chest wall  He complains of pain at the site  He is essentially nonverbal and is residing in a group home  His caretakers notice that he was pointing to his lower left rib area  He was assessed at triage and noted to have a heart rate of 27  He was brought into the room and put on the monitor  An EKG was performed and a third-degree heart block was immediately recognized  A call to Cardiology was obtained  After speaking to the cardiologist and due to the fact the patient has mental retardation and is nonverbal it was decided that the patient would be transferred to [facility] so he could have a permanent pacemaker placed under general anesthesia tomorrow  He will be admitted to the intensive care unit under Dr Chinmay Rodríguez care  Patient was hemodynamically stable upon transfer  He was transferred with the external pacer pads in place  Patient Progress  Patient progress: stable    CritCare Time    Disposition  Final diagnoses:    Third degree heart block (HCC)   Asthenia   Near syncope   Chest wall contusion, left, initial encounter - Acute     Time reflects when diagnosis was documented in both MDM as applicable and the Disposition within this note     Time User Action Codes Description Comment    1/18/2018 10:00 PM Esther Johnson [I44 2] Third degree heart block ([address] )     1/18/2018 10:01 PM Alexander Andre [R53 1] Asthenia     1/18/2018 10:01 PM Reyes Jules Add [R55] Near syncope     1/18/2018 10:01 PM Sallie Snellen, Laurena Hamilton Add [S20 212A] Chest wall contusion, left, initial encounter     1/18/2018 10:01 PM Saima Dixon Chest wall contusion, left, initial encounter Acute      ED Disposition     ED Disposition Condition Comment    Transfer to Another [address] [place] [address] should be transferred out to [facility]      MD Documentation    Jess Kay Most Recent Value   Patient Condition  The patient has been stabilized such that within reasonable medical probability, no material deterioration of the patient condition or the condition of the unborn child(severo) is likely to result from the transfer, An emergency transfer is being made prior to stabilization due to the need for definitive care and the benefit of transfer outweighs the risk   Reason for Transfer  Level of Care needed not available at this facility   Benefits of Transfer  Specialized equipment and/or services available at the receiving facility (Include comment)________________________   Accepting Physician  Dr Ole Moore Name, [address] [facility]    (Name & Tel number)  Alessia Nogueira and Unit #)  [place]   Sending MD SHARYN Ulloa   Provider Certification  General risk, such as traffic hazards, adverse weather conditions, rough terrain or turbulence, possible failure of equipment (including vehicle or aircraft), or consequences of actions of persons outside the control of the transport personnel, Unanticipated needs of medical equipment and personnel during transport, Risk of worsening condition, The possibility of a transport vehicle being unavailable      RN Documentation    Flowsheet Row Most [address] [facility] Name, [address] Assignment  [id number]    (Name & Tel number)  Julianne Pacheco Given to  [employer] by Mirlande and Unit #)  RACHAEL      Follow-up Information    None       Discharge Medication List as of 1/18/2018 11:07 PM      CONTINUE these medications which have NOT CHANGED    Details   acetaminophen (TYLENOL) 325 mg tablet Take 325 mg by mouth every 6 (six) hours as needed for mild pain, Historical Med      Calcium Carbonate-Vitamin D 600-400 MG-UNIT per chew tablet Chew 1 tablet 2 (two) times a day, Historical Med      calcium polycarbophil (FIBERCON) 625 mg tablet Take 625 mg by mouth daily, Historical Med      cetirizine (ZyrTEC) 10 mg tablet Take 10 mg by mouth daily, Historical Med      citalopram (CeleXA) 20 mg tablet Take 10 mg by mouth daily, Historical Med      guaiFENesin (ROBITUSSIN) 100 MG/5ML oral liquid Take 200 mg by mouth 3 (three) times a day as needed for cough, Historical Med      loperamide (IMODIUM A-D) 2 MG tablet Take 2 mg by mouth 4 (four) times a day as needed for diarrhea, Historical Med      Miconazole Nitrate 2 % AERP Apply topically, Historical Med      oxybutynin (DITROPAN) 5 mg tablet Take 5 mg by mouth 2 (two) times a day, Historical Med      risperiDONE (RisperDAL) 1 mg tablet Take 1 mg by mouth 2 (two) times a day, Historical Med      Starch-Maltodextrin (DIAFOODS THICK-IT PO) Take by mouth, Historical Med      terazosin (HYTRIN) 5 mg capsule Take 5 mg by mouth daily at bedtime, Historical Med           No discharge procedures on file      ED Provider  Electronically Signed by           Sumaya Carlton PA-C  01[facility]18/18 [id number] [facility]  01/18/18 [id number]

## 2018-01-19 NOTE — SOCIAL WORK
CM met with the patient and his POA/Sister, Tiffanie Dobbins this afternoon to discuss CM's role in his care and DC planning  Patient has a history significant for Severe Mental Retardation and Developmental Delay  Loretta states that the patient lives at [facility] [address] [place] [address]  At baseline he uses a wheelchair but is able to walk with an assist of two  Patient is followed by Kusum Lux (Service Access Management CM) who manages the patient's waiver services which pays for his group home  Julissa Gamez can be reached at [phone]  The House Supervisor at [facility] is [address]  She can be reached at [phone]  Per Loretta, the patient will be transported back to [facility] by staff once medically stable  They have a special handicap accessible [place] specifically for the patient  Patient educated on the importance of understanding their medical course and encouraged to ask questions of the medical team  [address] discussed  MEDS program introduced  Preferences: return to group home  Emergency Contact: Tiffanie Dobbins [facility]) [phone]    DC Checklist provided to patient's sister  CM continues to follow for any DC needs

## 2018-01-19 NOTE — H&P
History and Physical - Critical Care  Leary Patsy Jerome 78 y o  male MRN: [id number]  Unit/Bed#: [facility] 507-35 Encounter: [id number]     Reason for Admission / Chief Complaint: 3rd Degree heart block     History of Present Illness:  Skeet Essex is a 78 y o  male who presents with his sister, transferred from [facility] ED for 3rd degree heart block noted upon evaluation s/p unwitnessed fall earlier today  PMHx is most significant for MR/ developmental delay, otherwise as stated below  Patients sister states that he was in his normal state of health, at his group home, and was in the shower by himself (normally takes a shower on his own) and fell  It was unknown whether it was syncope (LOC?) or a mechanical fall but patient was conscious and acting appropriately after the incident  His sister states that he falls frequently and she thought that this incident was pretty standard for him but when he was evaluated in the ED and she was told that he had a very low heart rate, she was shocked and states that this has never been brought to her attention before  No new medications, no known exposures to ticks  Patient was evaluated in the ED at [facility] and was transferred to [facility] for cardiology evaluation and pacemaker placement  Throughout his ED stay and upon arrival to the ICU, patient has been asymptomatic and at his baseline per patients sister  History obtained from sibling  Past Medical History:  Past Medical History:   Diagnosis Date    Developmental non-verbal disorder     Dysphagia     moderate pharyngeal dysphagia    Gait disturbance     Hearing loss     Intellectual disability     Osteoarthritis     Osteoporosis     Prostate atrophy         Past Surgical History:  No past surgical history on file  Past Family History:  No family history on file       Social History:  History   Smoking Status    Never Smoker   Smokeless Tobacco    Never Used     History   Alcohol Use No     History   Drug Use No     Marital Status: Single     Medications:  Current Facility-Administered Medications   Medication Dose Route Frequency    chlorhexidine (PERIDEX) 0 12 % oral rinse 15 mL  15 mL Swish [facility] Spit Q12H [facility]    citalopram (CeleXA) tablet 10 mg  10 mg Oral Daily    enoxaparin (LOVENOX) subcutaneous injection 40 mg  40 mg Subcutaneous Q24H    risperiDONE (RisperDAL) tablet 1 mg  1 mg Oral BID     Home medications:  Prior to Admission medications    Medication Sig Start Date End Date Taking?  Authorizing Provider   acetaminophen (TYLENOL) 325 mg tablet Take 325 mg by mouth every 6 (six) hours as needed for mild pain    Historical Provider, MD   Calcium Carbonate-Vitamin D 600-400 MG-UNIT per chew tablet Chew 1 tablet 2 (two) times a day    Historical Provider, MD   calcium polycarbophil (FIBERCON) 625 mg tablet Take 625 mg by mouth daily    Historical Provider, MD   cetirizine (ZyrTEC) 10 mg tablet Take 10 mg by mouth daily    Historical Provider, MD   citalopram (CeleXA) 20 mg tablet Take 10 mg by mouth daily    Historical Provider, MD   guaiFENesin (ROBITUSSIN) 100 MG/5ML oral liquid Take 200 mg by mouth 3 (three) times a day as needed for cough    Historical Provider, MD   loperamide (IMODIUM A-D) 2 MG tablet Take 2 mg by mouth 4 (four) times a day as needed for diarrhea    Historical Provider, MD   Miconazole Nitrate 2 % AERP Apply topically    Historical Provider, MD   oxybutynin (DITROPAN) 5 mg tablet Take 5 mg by mouth 2 (two) times a day    Historical Provider, MD   risperiDONE (RisperDAL) 1 mg tablet Take 1 mg by mouth 2 (two) times a day    Historical Provider, MD   Starch-Maltodextrin (DIAFOODS THICK-IT PO) Take by mouth    Historical Provider, MD   terazosin (HYTRIN) 5 mg capsule Take 5 mg by mouth daily at bedtime    Historical Provider, MD     Allergies:  No Known Allergies     ROS:   Review of Systems   Unable to perform ROS: Patient nonverbal        Vitals:  Vitals:    01/18/18 2300   BP: 154/78   BP Location: Right arm   Pulse: (!) 31   Resp: 20   Temp: 98 1 °F (36 7 °C)   TempSrc: Oral   SpO2: 100%     Temperature:   Temp (24hrs), Av 8 °F (36 6 °C), Min:97 5 °F (36 4 °C), Max:98 1 °F (36 7 °C)    Current: Temperature: 98 1 °F (36 7 °C)     Weights: There is no height or weight on file to calculate BMI  Physical Exam:  Physical Exam   Constitutional: He appears well-developed  No distress  HENT:   Head: Normocephalic and atraumatic  Eyes: Pupils are equal, round, and reactive to light  Neck: Normal range of motion  Neck supple  No spinous process tenderness and no muscular tenderness present  Normal range of motion present  Cardiovascular: Intact distal pulses  Bradycardia present  Pulmonary/Chest: Effort normal and breath sounds normal  No accessory muscle usage or stridor  No respiratory distress  He has no decreased breath sounds  He has no wheezes  He has no rhonchi  He has no rales  Abdominal: Soft  Normal appearance  There is no hepatosplenomegaly  There is no tenderness  There is no rigidity, no rebound, no guarding, no tenderness at McBurney's point and negative Martinez's sign  Neurological: GCS eye subscore is 4  GCS verbal subscore is 5  GCS motor subscore is 6  Skin: He is not diaphoretic          Labs:    Results from last 7 days  Lab Units 18   WBC Thousand/uL 7 18   HEMOGLOBIN g/dL 15 1   HEMATOCRIT % 45 5   PLATELETS Thousands/uL 204   NEUTROS PCT % 37*   MONOS PCT % 9        Results from last 7 days  Lab Units 18   SODIUM mmol/L 145   POTASSIUM mmol/L 4 2   CHLORIDE mmol/L 107   CO2 mmol/L 31   BUN mg/dL 28*   CREATININE mg/dL 1 40*   CALCIUM mg/dL 9 0   GLUCOSE RANDOM mg/dL 95       Results from last 7 days  Lab Units 18   MAGNESIUM mg/dL 2 1            Results from last 7 days  Lab Units 18   INR  1 01   PTT seconds 30           0  Lab Value Date/Time   TROPONINI <0 02 2018        Imaging: CXR without evidence of displaced fx or PTX I have personally reviewed pertinent films in PACS  EKG: 3rd Degree HB  This was personally reviewed by myself  Micro:  No results found for: Lizandro Wilson, SPUTUMCULTUR    Assessment: 79 y/o M admitted for 3rd degree HB  Plan:                  Neuro: MR/ Developmental delay     -Baseline              CV: 3rd degree HB     - HR 30-35, hyper/normotensive, asymptomatic    - Cardiology consult    - PM tomorrow    - Pacer pads in place              GI: NPO for procedure              ID: Lyme pending              Endo: TSH unremarkable               Msk/Skin: Contusion to left posterior ribs    -AM CXR to r/o PTX prior to procedure                 Disposition: ICU     VTE Pharmacologic Prophylaxis: Enoxaparin (Lovenox)  VTE Mechanical Prophylaxis: sequential compression device     Invasive lines and devices: Invasive Devices     Peripheral Intravenous Line            Peripheral IV 01/18/18 Left Antecubital less than 1 day    Peripheral IV 01/18/18 Right Antecubital less than 1 day                 Code Status: Level 1 - Full Code  POA:    POLST:       Given critical illness, patient length of stay will require greater than two midnights  Counseling / Coordination of Care  Total Critical Care time spent 30 minutes excluding procedures, teaching and family updates  Portions of the record may have been created with voice recognition software  Occasional wrong word or "sound a like" substitutions may have occurred due to the inherent limitations of voice recognition software  Read the chart carefully and recognize, using context, where substitutions have occurred          Genna Gregg PA-C

## 2018-01-19 NOTE — DISCHARGE INSTRUCTIONS
Please refer to post pacemaker/ICD implantation discharge instructions and restrictions and your ICD booklet/temporary card  Keep incision dry for one week  Do not use lotions/powders/creams on incision  Remove outer bandage 48 hours after implantation  Leave underlying steri-strips in place, they will either fall off on their own or will be removed at 2 week follow up appointment  No overhead reaching/pushing/pulling/lifting greater than 5-10lbs with left arm for one month  Please call the office if you notice redness, swelling, bleeding, or drainage from incision or if you develop fevers  Pacemaker   WHAT YOU NEED TO KNOW:   A pacemaker is a small, battery-powered device that is implanted into your chest to help regulate your heart rate  DISCHARGE INSTRUCTIONS:   Medicines:   · Pain medicine: You may need medicine to take away or decrease pain  ¨ Learn how to take your medicine  Ask what medicine and how much you should take  Be sure you know how, when, and how often to take it  ¨ Do not wait until the pain is severe before you take your medicine  Tell caregivers if your pain does not decrease  ¨ Pain medicine can make you dizzy or sleepy  Prevent falls by calling someone when you get out of bed or if you need help  · Antibiotics: This medicine is given to fight or prevent an infection caused by bacteria  Always take your antibiotics exactly as ordered by your healthcare provider  Do not stop taking your medicine unless directed by your healthcare provider  Never save antibiotics or take leftover antibiotics that were given to you for another illness  · Take your medicine as directed  Contact your healthcare provider if you think your medicine is not helping or if you have side effects  Tell him or her if you are allergic to any medicine  Keep a list of the medicines, vitamins, and herbs you take  Include the amounts, and when and why you take them   Bring the list or the pill bottles to follow-up visits  Carry your medicine list with you in case of an emergency  Follow up with your cardiologist after your procedure: You may need a follow-up visit 7 to 10 days after you leave the hospital  Your cardiologist will check your wound and make sure that your pacemaker is working correctly  Follow the instructions to check your pacemaker: Your cardiologist or healthcare provider will check your pacemaker and the battery regularly, usually every 3 to 6 months  He may do this in his office  He may also use a computer to check your pacemaker over the telephone between visits  Pacemaker batteries usually last 5 to 8 years  The pacemaker unit will be replaced when the battery gets low  This is a simpler procedure than the original one to implant your pacemaker  Heart rate checks:   · You may need to use a heart monitor at home after your procedure  This device may be called an event monitor, Holter monitor, or mobile telemetry  Monitoring may be done for a period of time, such as a week, or at regular intervals until your heart rhythm is regular  · You may be taught how to check your pulse on your wrist or on your neck  This allows you to monitor how your pacemaker is working  A normal heart beats 50 to 70 times a minute when you are resting  Ask what your pulse should be (your pacemaker's set rate)  Wound care:  Keep your incisions clean and dry for 7 to 10 days after your procedure  Ask your healthcare provider how to care for your incisions and when you can shower or bathe  Activity:   · Arm movement and lifting:  Be careful using the arm on the side of your pacemaker  Do not move your arm for the first 24 hours after your procedure  Do not  lift your arm above your shoulder or lift more than 10 pounds for 6 weeks after your procedure  This helps the leads stay in place and helps your wound heal  Ask your healthcare provider when you can drive after your procedure       · Sports:  Ask your healthcare provider when it is okay to play tennis, golf, basketball, or any sport that requires you to lift your arms  Do not play full contact sports, such as football, that could damage your pacemaker  Ask your cardiologist or healthcare provider how much and what kinds of physical activity are safe for you  Living with a pacemaker:   · Tell all healthcare providers you have a pacemaker: This includes surgeons, radiologists, and medical technicians  You may want to wear a medical alert ID bracelet or necklace that states that you have a pacemaker  · Carry your pacemaker ID card: Make sure you receive a pacemaker ID card  Carry it with you at all times  It lists important information about your pacemaker  Show it to airport security if you travel  · Avoid electrical interference:  Avoid welding equipment, MRI machines, and other equipment with large magnets or electric fields  These things could interfere with how your pacemaker works  Use your cell phone on the ear opposite from your pacemaker  Do not carry your cell phone in your shirt pocket over your chest      · Do not touch the skin around your pacemaker: This can cause damage to the lead wires or move the pacemaker unit from where it should be  Contact your cardiologist or healthcare provider if:   · The area around your pacemaker is painful or tender after surgery  · The skin around your stitches is red, swollen, or has drainage  This may mean that you have an infection  · You have a fever  · You have chills, a cough, and feel weak or achy  These are also signs of infection  · Your feet or ankles are swollen  Seek care immediately if:   · Your bandage becomes soaked with blood  · Your stitches open up  · You feel your heart suddenly beating very slowly or quickly  · You become too weak or dizzy to stand, or you pass out  · Your arm or leg feels warm, tender, and painful  It may look swollen and red  You have chest pain that does not go away with rest or medicine  · You feel lightheaded, short of breath, and have chest pain  You cough up blood  © 2017 [address] Information is for End User's use only and may not be sold, redistributed or otherwise used for commercial purposes  All illustrations and images included in CareNotes® are the copyrighted property of A D A M , Inc  or Chris Mendoza  The above information is an  only  It is not intended as medical advice for individual conditions or treatments  Talk to your doctor, nurse or pharmacist before following any medical regimen to see if it is safe and effective for you

## 2018-01-19 NOTE — EMTALA/ACUTE CARE TRANSFER
[address] [address] [address]  Dept: [phone]      EMTALA TRANSFER CONSENT    NAME Haresh Jerome                                         1950                              MRN [id number]    I have been informed of my rights regarding examination, treatment, and transfer   by Dr Gricelda Marcos att  providers found    Benefits: Specialized equipment and/or services available at the receiving facility (Include comment)________________________    Risks:        Transfer Request   I acknowledge that my medical condition has been evaluated and explained to me by the emergency department physician or other qualified medical person and/or my attending physician who has recommended and offered to me further medical examination and treatment  I understand the Hospital's obligation with respect to the treatment and stabilization of my emergency medical condition  I nevertheless request to be transferred  I release the Hospital, the doctor, and any other persons caring for me from all responsibility or liability for any injury or ill effects that may result from my transfer and agree to accept all responsibility for the consequences of my choice to transfer, rather than receive stabilizing treatment at the Hospital  I understand that because the transfer is my request, my insurance may not provide reimbursement for the services  The Hospital will assist and direct me and my family in how to make arrangements for transfer, but the hospital is not liable for any fees charged by the transport service  In spite of this understanding, I refuse to consent to further medical examination and treatment which has been offered to me, and request transfer to  [address]  I authorize the performance of emergency medical procedures and treatments upon me in both transit and upon arrival at the receiving facility    Additionally, I authorize the release of any and all medical records to the receiving facility and request they be transported with me, if possible  I authorize the performance of emergency medical procedures and treatments upon me in both transit and upon arrival at the receiving facility  Additionally, I authorize the release of any and all medical records to the receiving facility and request they be transported with me, if possible  I understand that the safest mode of transportation during a medical emergency is an ambulance and that the Hospital advocates the use of this mode of transport  Risks of traveling to the receiving facility by car, including absence of medical control, life sustaining equipment, such as oxygen, and medical personnel has been explained to me and I fully understand them  (SANTHOS[place] CORRECT BOX BELOW)  [  ]  I consent to the stated transfer and to be transported by ambulance/helicopter  [  ]  I consent to the stated transfer, but refuse transportation by ambulance and accept full responsibility for my transportation by car  I understand the risks of non-ambulance transfers and I exonerate the Hospital and its staff from any deterioration in my condition that results from this refusal     X___________________________________________    DATE  18  TIME________  Signature of patient or legally responsible individual signing on patient behalf           RELATIONSHIP TO PATIENT_________________________          Provider Certification    NAME Alhaji Jerome                                         1950                              MRN [id number]    A medical screening exam was performed on the above named patient  Based on the examination:    Condition Necessitating Transfer The primary encounter diagnosis was Third degree heart block ([address] )  Diagnoses of Asthenia, Near syncope, and Chest wall contusion, left, initial encounter were also pertinent to this visit  Patient Condition:  The patient has been stabilized such that within reasonable medical probability, no material deterioration of the patient condition or the condition of the unborn child(severo) is likely to result from the transfer, An emergency transfer is being made prior to stabilization due to the need for definitive care and the benefit of transfer outweighs the risk    Reason for Transfer: Level of Care needed not available at this facility    Transfer Requirements: [address]   · Space available and qualified personnel available for treatment as acknowledged by Rob Dominique  · Agreed to accept transfer and to provide appropriate medical treatment as acknowledged by       Dr Ilan Bustamante  · Appropriate medical records of the examination and treatment of the patient are provided at the time of transfer   [address] [facility], [address] _______  · Transfer will be performed by qualified personnel from [facility]  and appropriate transfer equipment as required, including the use of necessary and appropriate life support measures      Provider Certification: I have examined the patient and explained the following risks and benefits of being transferred/refusing transfer to the patient/family:  General risk, such as traffic hazards, adverse weather conditions, rough terrain or turbulence, possible failure of equipment (including vehicle or aircraft), or consequences of actions of persons outside the control of the transport personnel, Unanticipated needs of medical equipment and personnel during transport, Risk of worsening condition, The possibility of a transport vehicle being unavailable      Based on these reasonable risks and benefits to the patient and/or the unborn child(severo), and based upon the information available at the time of the patients examination, I certify that the medical benefits reasonably to be expected from the provision of appropriate medical treatments at another medical facility outweigh the increasing risks, if any, to the individuals medical condition, and in the case of labor to the unborn child, from effecting the transfer      X____________________________________________ DATE 01/18/18        TIME_______      ORIGINAL - SEND TO MEDICAL RECORDS   COPY - SEND WITH PATIENT DURING TRANSFER

## 2018-01-19 NOTE — CONSULTS
Consultation - Cardiology   Beata Jerome 78 y o  male MRN: [id number]  Unit/Bed#: [facility] 514-01 Encounter: [id number]      Assessment:    Complete heart block  --->EKG revealing sinus tachycardia with junctional escape rhythm, RBBB at baseline  --->Is currently hemodynamically stable with SBP 150s, appears alert and comfortable   ---->Would plan for permanent pacemaker placement in the morning   ----> HR in 30s, would hold off on emergent transvenous pacer placement, as patient is at high risk of pulling the catheter out    --->  However if he becomes hemodynamically stable or symptomatic, would give dopamine vs place an emergent TVP possible with sedation and intubation, plan discussed with his sister in detail, she expressed understanding and agrees with the plan   ---->would keep the transcutaneous pacing pads attached   ---->obtain echocardiogram in the morning  - --->would monitor electrolytes to keep potassium 4-4 5, magnesium 2-2 5  ----->would hold off on starting any of his antipsychotics tonight   ---> Keep NPO past midnight    Mental retardation  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, deafness, nonverbal, known RBBB, no significant prior cardiac history presented to [address] [place] [address] at [place] [facility] after a fall in the shower at group home today    History obtained from his sister who was at bedside, patient has a known history of unstable gait and recurrent falls for several years, today the group home staff noted he fell again in the shower and hit the left side of the chest   It is unclear if he had any loss of consciousness  Presentation EKG revealed sinus tachycardia, complete heart block with junctional escape rhythm, baseline right bundle branch block  He has been hemodynamically stable throughout, awake and appears comfortable  He was then transferred over to [facility] for pacemaker placement  His 1st set of troponin is negative        Consults    Review of Systems:  Review of Systems    Unable to obtain    Historical Information   Past Medical History:   Diagnosis Date    Developmental non-verbal disorder     Dysphagia     moderate pharyngeal dysphagia    Gait disturbance     Hearing loss     Intellectual disability     Osteoarthritis     Osteoporosis     Prostate atrophy      No past surgical history on file  History   Alcohol Use No     History   Drug Use No     History   Smoking Status    Never Smoker   Smokeless Tobacco    Never Used     Family History: non-contributory    Meds/Allergies   PTA meds:   Prior to Admission Medications   Prescriptions Last Dose Informant Patient Reported? Taking?    Calcium Carbonate-Vitamin D 600-400 MG-UNIT per chew tablet   Yes No   Sig: Chew 1 tablet 2 (two) times a day   Miconazole Nitrate 2 % AERP   Yes No   Sig: Apply topically   Starch-Maltodextrin (DIAFOODS THICK-IT PO)   Yes No   Sig: Take by mouth   acetaminophen (TYLENOL) 325 mg tablet   Yes No   Sig: Take 325 mg by mouth every 6 (six) hours as needed for mild pain   calcium polycarbophil (FIBERCON) 625 mg tablet   Yes No   Sig: Take 625 mg by mouth daily   cetirizine (ZyrTEC) 10 mg tablet   Yes No   Sig: Take 10 mg by mouth daily   citalopram (CeleXA) 20 mg tablet   Yes No   Sig: Take 10 mg by mouth daily   guaiFENesin (ROBITUSSIN) 100 MG/5ML oral liquid   Yes No   Sig: Take 200 mg by mouth 3 (three) times a day as needed for cough   loperamide (IMODIUM A-D) 2 MG tablet   Yes No   Sig: Take 2 mg by mouth 4 (four) times a day as needed for diarrhea   oxybutynin (DITROPAN) 5 mg tablet   Yes No   Sig: Take 5 mg by mouth 2 (two) times a day   risperiDONE (RisperDAL) 1 mg tablet   Yes No   Sig: Take 1 mg by mouth 2 (two) times a day   terazosin (HYTRIN) 5 mg capsule   Yes No   Sig: Take 5 mg by mouth daily at bedtime      Facility-Administered Medications: None     No Known Allergies    Objective   Vitals: Blood pressure 154/78, pulse (!) 31, temperature 98 1 °F (36 7 °C), temperature source Oral, resp  rate 20, SpO2 100 %  , There is no height or weight on file to calculate BMI , Orthostatic Blood Pressures    Flowsheet Row Most Recent Value   Blood Pressure  154/78 filed at 01/18/2018 2300   Patient Position - Orthostatic VS  Lying filed at 01/18/2018 2300            Intake/Output Summary (Last 24 hours) at 01/18/18 2328  Last data filed at 01/18/18 2301   Gross per 24 hour   Intake                0 ml   Output              200 ml   Net             -200 ml       Invasive Devices     Peripheral Intravenous Line            Peripheral IV 01/18/18 Left Antecubital less than 1 day    Peripheral IV 01/18/18 Right Antecubital less than 1 day                      Physical Exam    Gen:  Alert, awake, nonverbal, cooperative, No acute distress  HEENT: anicteric, mucous membranes moist  Neck:  No JVD  Heart:  Irregular, bradycardic, no murmurs  Lungs :clear to auscultation bilaterally, no rales/rhonchi or wheeze  Chest wall- left side is tender to touch  Abdomen: soft nontender, normoactive bowel sounds, no organomegaly  Ext:  Trace edema  Skin: warm, no rashes  Lab Results:     Lab Results   Component Value Date    TROPONINI <0 02 01/18/2018       Lab Results   Component Value Date    GLUCOSE 95 01/18/2018    CALCIUM 9 0 01/18/2018     01/18/2018    K 4 2 01/18/2018    CO2 31 01/18/2018     01/18/2018    BUN 28 (H) 01/18/2018    CREATININE 1 40 (H) 01/18/2018       Lab Results   Component Value Date    WBC 7 18 01/18/2018    HGB 15 1 01/18/2018    HCT 45 5 01/18/2018    MCV 92 01/18/2018  01/18/2018       No results found for: CHOL  No results found for: HDL  No results found for: LDLCALC  No results found for: TRIG    No results found for: ALT, AST      Results from last 7 days  Lab Units 01/18/18  2109   INR  1 01         Imaging: I have personally reviewed pertinent reports

## 2018-01-19 NOTE — PROGRESS NOTES
Progress Note - Critical Care   Beryl Jerome 78 y o  male MRN: [id number]  [facility] Encounter: [id number]    Attending Physician: Antoine Whiteside MD      ______________________________________________________________________  Assessment and Plan:   Principal Problem: Third degree heart block (HCC)  Active Problems:    MR (mental retardation)    Chest wall contusion  Resolved Problems:    * No resolved hospital problems  *        Neuro: At baseline MR per sister  Cont neuro checks  Yuhaaviatam  CV: 3rd degree heart block  For pacemaker today  Pacer pads in place  Pulm: no acute pulm issues at this time    GI: npo  Advance diet after procedure    : monitor I/o's  Replace potassium    ID: no infxn at this time  Heme: stable at this time  Monitor  Endo: glucose control     Msk/Skin: monitor left cw contusion  Disposition: cont icu care  Ppm today  Code Status: Level 1 - Full Code    Counseling / Coordination of Care  Total Critical Care time spent 16 minutes excluding procedures, teaching and family updates  ______________________________________________________________________    Chief Complaint: pt denies  24 Hour Events: Pt comfortable in bed without complaints but hx of MR with minimal speech and [place]  Sister at bedside and states pt fell  Unknown loc  Hx of falling a lot  Pt fell at home and was found to be in 3rd degree hb and sent to [facility] [place] then transferred here for pacemaker placement  Pt wants food but otherwise comfortable in no distress      ______________________________________________________________________    Physical Exam:   Neuro: Told me his name and asked mine  Follows minor commands  Luisa George, reg, no m,r,g  Resp:  cta b/l anterior cw  Abd: soft, nt, nd, +BS  Ext: no edema, +pulses, +scds,   Skin:  Left cw with ecchymosis  No marta abnormality noted  No crepitus  ______________________________________________________________________  Vitals:    18 0739 18 0800 18 0900 18 1000   BP: 119/75 125/64 118/63 104/61   Pulse: (!) 32 (!) 32 (!) 34 (!) 40   Resp: 22 (!) 24 (!) 24 20   Temp:       TempSrc:       SpO2: 100% 99% 98% 98%   Weight:       Height:           Temperature:   Temp (24hrs), Av °F (36 7 °C), Min:97 5 °F (36 4 °C), Max:98 4 °F (36 9 °C)    Current Temperature: 98 4 °F (36 9 °C)  Weights:   IBW: 54 6 kg    Body mass index is 21 98 kg/m²  Weight (last 2 days)     Date/Time   Weight    18 0500  54 5 (120 15)               SpO2: SpO2: 98 %  Intake and Outputs:  I/O        07 -  0700  07 -  0700  07 -  0700    Urine (mL/kg/hr)  400 400 (2 1)    Stool   0 (0)    Total Output   400 400    Net   -400 -400           Unmeasured Urine Occurrence  4 x     Unmeasured Stool Occurrence   1 x         Nutrition:        Diet Orders            Start     Ordered    18 2340  Diet NPO  Diet effective now     Question Answer Comment   Diet Type NPO    RD to adjust diet per protocol?  Yes        18 2349          Labs:     Results from last 7 days  Lab Units 18  0519 18  2109   WBC Thousand/uL 7 66 7 18   HEMOGLOBIN g/dL 15 6 15 1   HEMATOCRIT % 44 6 45 5   PLATELETS Thousands/uL 207 204   NEUTROS PCT % 55 37*   MONOS PCT % 9 9       Results from last 7 days  Lab Units 18  0519 18  2109   SODIUM mmol/L 141 145   POTASSIUM mmol/L 3 8 4 2   CHLORIDE mmol/L 108 107   CO2 mmol/L 25 31   BUN mg/dL 22 28*   CREATININE mg/dL 1 05 1 40*   CALCIUM mg/dL 8 8 9 0   GLUCOSE RANDOM mg/dL 85 95       Results from last 7 days  Lab Units 18  0519 18  2109   MAGNESIUM mg/dL 2 2 2 1     Lab Results   Component Value Date    PHOS 3 4 2018        Results from last 7 days  Lab Units 18  0519 18  2109   INR  1 12 1 01   PTT seconds 32 30       0  Lab Value Date/Time TROPONINI <0 02 2018         ABG:No results found for: PHART, LCJ2JWW, PO2ART, VHV1TCB, M8HYIHTO, BEART, SOURCE  Imagin/19 cxr- cardiomegally  No fx or ptx  I have personally reviewed pertinent reports  and I have personally reviewed pertinent films in PACS  EKG: sinus tachy with 3rd degree block  Micro:  No results found for: Fely Crate, WOUNDCULT, SPUTUMCULTUR  Allergies: No Known Allergies  Medications:   Scheduled Meds:  chlorhexidine 15 mL Swish [employer] Spit Q12H [address]   citalopram 10 mg Oral Daily   enoxaparin 40 mg Subcutaneous Q24H   risperiDONE 1 mg Oral BID     Continuous Infusions:   PRN Meds:     VTE Pharmacologic Prophylaxis: Enoxaparin (Lovenox)  VTE Mechanical Prophylaxis: sequential compression device  Invasive lines and devices: Invasive Devices     Peripheral Intravenous Line            Peripheral IV 18 Left Antecubital less than 1 day    Peripheral IV 18 Right Antecubital less than 1 day                     Portions of the record may have been created with voice recognition software  Occasional wrong word or "sound a like" substitutions may have occurred due to the inherent limitations of voice recognition software  Read the chart carefully and recognize, using context, where substitutions have occurred      Graham Dotson PA-C

## 2018-01-19 NOTE — PLAN OF CARE
CARDIOVASCULAR - ADULT     Maintains optimal cardiac output and hemodynamic stability Progressing     Absence of cardiac dysrhythmias or at baseline rhythm Progressing        Potential for Falls     Patient will remain free of falls Progressing

## 2018-01-19 NOTE — ED NOTES
[facility] bedside to transport Pt to [facility], Pt caregiver bedside and will follow  Pt resting in bed, will monitor       Abhilash Montes RN  01/18/18 5583

## 2018-01-19 NOTE — PLAN OF CARE
Problem: DISCHARGE PLANNING - CARE MANAGEMENT  Goal: Discharge to post-acute care or home with appropriate resources  INTERVENTIONS:  - Conduct assessment to determine patient/family and health care team treatment goals, and need for post-acute services based on payer coverage, community resources, and patient preferences, and barriers to discharge  - Address psychosocial, clinical, and financial barriers to discharge as identified in assessment in conjunction with the patient/family and health care team  - Arrange appropriate level of post-acute services according to patient's   needs and preference and payer coverage in collaboration with the physician and health care team  - Communicate with and update the patient/family, physician, and health care team regarding progress on the discharge plan  - Arrange appropriate transportation to post-acute venues  - [place] T[place] will return to [facility] [address] who will provide transportation     Outcome: Progressing

## 2018-01-20 ENCOUNTER — APPOINTMENT (INPATIENT)
Dept: RADIOLOGY | Facility: HOSPITAL | Age: 68
DRG: 243 | End: 2018-01-20
Payer: COMMERCIAL

## 2018-01-20 PROBLEM — N32.81 OVERACTIVE BLADDER: Status: ACTIVE | Noted: 2018-01-20

## 2018-01-20 LAB
ANION GAP SERPL CALCULATED.3IONS-SCNC: 8 MMOL/L (ref 4–13)
ATRIAL RATE: 73 BPM
BUN SERPL-MCNC: 19 MG/DL (ref 5–25)
CALCIUM SERPL-MCNC: 8.3 MG/DL (ref 8.3–10.1)
CHLORIDE SERPL-SCNC: 110 MMOL/L (ref 100–108)
CO2 SERPL-SCNC: 23 MMOL/L (ref 21–32)
CREAT SERPL-MCNC: 1.08 MG/DL (ref 0.6–1.3)
ERYTHROCYTE [DISTWIDTH] IN BLOOD BY AUTOMATED COUNT: 12.6 % (ref 11.6–15.1)
GFR SERPL CREATININE-BSD FRML MDRD: 71 ML/MIN/1.73SQ M
GLUCOSE SERPL-MCNC: 122 MG/DL (ref 65–140)
HCT VFR BLD AUTO: 44 % (ref 36.5–49.3)
HGB BLD-MCNC: 15.1 G/DL (ref 12–17)
INR PPP: 1.2 (ref 0.86–1.16)
MCH RBC QN AUTO: 30.9 PG (ref 26.8–34.3)
MCHC RBC AUTO-ENTMCNC: 34.3 G/DL (ref 31.4–37.4)
MCV RBC AUTO: 90 FL (ref 82–98)
P AXIS: 44 DEGREES
PLATELET # BLD AUTO: 187 THOUSANDS/UL (ref 149–390)
PMV BLD AUTO: 9.4 FL (ref 8.9–12.7)
POTASSIUM SERPL-SCNC: 3.9 MMOL/L (ref 3.5–5.3)
PR INTERVAL: 183 MS
PROTHROMBIN TIME: 15.3 SECONDS (ref 12.1–14.4)
QRS AXIS: 254 DEGREES
QRSD INTERVAL: 154 MS
QT INTERVAL: 483 MS
QTC INTERVAL: 533 MS
RBC # BLD AUTO: 4.88 MILLION/UL (ref 3.88–5.62)
SODIUM SERPL-SCNC: 141 MMOL/L (ref 136–145)
T WAVE AXIS: 67 DEGREES
VENTRICULAR RATE: 73 BPM
WBC # BLD AUTO: 10.97 THOUSAND/UL (ref 4.31–10.16)

## 2018-01-20 PROCEDURE — 85610 PROTHROMBIN TIME: CPT | Performed by: PHYSICIAN ASSISTANT

## 2018-01-20 PROCEDURE — 85027 COMPLETE CBC AUTOMATED: CPT | Performed by: PHYSICIAN ASSISTANT

## 2018-01-20 PROCEDURE — 80048 BASIC METABOLIC PNL TOTAL CA: CPT | Performed by: PHYSICIAN ASSISTANT

## 2018-01-20 PROCEDURE — 71046 X-RAY EXAM CHEST 2 VIEWS: CPT

## 2018-01-20 RX ORDER — OXYBUTYNIN CHLORIDE 5 MG/1
5 TABLET ORAL 2 TIMES DAILY
Status: DISCONTINUED | OUTPATIENT
Start: 2018-01-20 | End: 2018-01-21 | Stop reason: HOSPADM

## 2018-01-20 RX ADMIN — OXYBUTYNIN CHLORIDE 5 MG: 5 TABLET ORAL at 17:35

## 2018-01-20 RX ADMIN — ENOXAPARIN SODIUM 40 MG: 40 INJECTION SUBCUTANEOUS at 22:07

## 2018-01-20 RX ADMIN — CITALOPRAM HYDROBROMIDE 10 MG: 10 TABLET ORAL at 08:33

## 2018-01-20 RX ADMIN — RISPERIDONE 1 MG: 1 TABLET ORAL at 08:33

## 2018-01-20 RX ADMIN — RISPERIDONE 1 MG: 1 TABLET ORAL at 17:35

## 2018-01-20 RX ADMIN — OXYBUTYNIN CHLORIDE 5 MG: 5 TABLET ORAL at 11:11

## 2018-01-20 RX ADMIN — ACETAMINOPHEN 650 MG: 325 TABLET, FILM COATED ORAL at 06:51

## 2018-01-20 NOTE — PROGRESS NOTES
[facility] Internal Medicine  Progress Note Amy Jerome 1950, 78 y o  male MRN: [id number]  Unit/Bed#: [facility] 508-01 Encounter: [id number]  Primary Care Provider: Rebecca Beth MD   Date and time admitted to hospital: 2018 11:08 PM    * Third degree heart block ([address] )   Assessment & Plan    · S/P PPM yesterday evening  · Pacemaker interrogation performed  · CXR this AM- await radiology read  · Cardio to re-eval this AM  · Needs VNA with nursing for group home and 24 hrs post procedure prior to d/c        MR (mental retardation)   Assessment & Plan    · Will return to group home when able (group home team member present, needs 24 hr post-procedure prior to taking back to facility)  · Plan for d/c tomorrow  · Continue home medications (risperdal)        Overactive bladder   Assessment & Plan    · Continue oxybutinin          VTE Pharmacologic Prophylaxis:   Pharmacologic: Enoxaparin (Lovenox)  Mechanical VTE Prophylaxis in Place: Yes    Patient Centered Rounds: I have performed bedside rounds with nursing staff today  RN    Discussions with Specialists or Other Care Team Provider: nursing, cardio    Education and Discussions with Family / Patient: patient, sister, group home    Time Spent for Care: 30 minutes  More than 50% of total time spent on counseling and coordination of care as described above  Current Length of Stay: 2 day(s)    Current Patient Status: Inpatient   Certification Statement: The patient will continue to require additional inpatient hospital stay due to to group home tomorrow, monitor on tele    Discharge Plan: plan to discharge to group home tomorrow with VNA    Code Status: Level 1 - Full Code      Subjective:   Patient reports that he has pain in his left chest/shoulder  He nods his head yes or no and points to his shoulder when he is asked if he has pain  Family report he is slightly more sleepy today       Objective:     Vitals:   Temp (24hrs), Av 3 °F (36 8 °C), Min:97 6 °F (36 4 °C), Max:99 1 °F (37 3 °C)    HR:  [22-85] 85  Resp:  [13-34] 18  BP: ()/() 103/71  SpO2:  [91 %-100 %] 91 %  Body mass index is 21 98 kg/m²  Input and Output Summary (last 24 hours): Intake/Output Summary (Last 24 hours) at 01/20/18 1055  Last data filed at 01/20/18 0900   Gross per 24 hour   Intake              560 ml   Output              850 ml   Net             -290 ml       Physical Exam:     Physical Exam   Constitutional: No distress  HENT:   Head: Normocephalic and atraumatic  Eyes: Conjunctivae are normal    Neck: No JVD present  Cardiovascular: Normal rate, regular rhythm, normal heart sounds and intact distal pulses  No murmur heard  Pulmonary/Chest: Effort normal and breath sounds normal  No respiratory distress  He has no wheezes  He has no rales  Abdominal: Soft  Bowel sounds are normal  He exhibits no distension  There is no tenderness  There is no rebound and no guarding  Musculoskeletal: He exhibits no edema  Neurological:   Laying flat, saying he "is fine"  Asks if he has pain and he points to his left chest and shoulder  Follows commands for deep breaths, sitting up   Skin: Skin is warm and dry  No rash noted  He is not diaphoretic  No erythema  Nursing note and vitals reviewed  Additional Data:     Labs:      Results from last 7 days  Lab Units 01/20/18  0444 01/19/18  0519   WBC Thousand/uL 10 97* 7 66   HEMOGLOBIN g/dL 15 1 15 6   HEMATOCRIT % 44 0 44 6   PLATELETS Thousands/uL 187 207   NEUTROS PCT %  --  55   LYMPHS PCT %  --  33   MONOS PCT %  --  9   EOS PCT %  --  3       Results from last 7 days  Lab Units 01/20/18  0444   SODIUM mmol/L 141   POTASSIUM mmol/L 3 9   CHLORIDE mmol/L 110*   CO2 mmol/L 23   BUN mg/dL 19   CREATININE mg/dL 1 08   CALCIUM mg/dL 8 3   GLUCOSE RANDOM mg/dL 122       Results from last 7 days  Lab Units 01/20/18  0444   INR  1 20*       * I Have Reviewed All Lab Data Listed Above    * Additional Pertinent Lab Tests Reviewed: All Labs Within Last 24 Hours Reviewed    Imaging:    Imaging Reports Reviewed Today Include: CXR  Imaging Personally Reviewed by Myself Includes:  CXR    Recent Cultures (last 7 days):           Last 24 Hours Medication List:     citalopram 10 mg Oral Daily   enoxaparin 40 mg Subcutaneous Q24H   oxybutynin 5 mg Oral BID   risperiDONE 1 mg Oral BID        Today, Patient Was Seen By: Chyna Goss PA-C    ** Please Note: Dictation voice to text software may have been used in the creation of this document   **

## 2018-01-20 NOTE — SOCIAL WORK
PANCHO was in contact with April of [facility] [address] to discuss the Pt's readiness for d/c, and was advised the group will need 24 hours notice prior to d/c  April reported the group home can accept the Pt back tomorrow 1/21 and will provide transport  CM arranged with April for the Pt to be  tomorrow at 11am for d/c back to the group home  PANCHO notified Pt's sister/POA, RN Fazal Mann and facility April of d/c arrangements  CM made a [place] [phone] referral to [facility]  for RN services with the agreement of April

## 2018-01-20 NOTE — ANESTHESIA POSTPROCEDURE EVALUATION
Post-Op Assessment Note      CV Status:  Stable    Mental Status:  Alert and awake    Hydration Status:  Euvolemic and stable    PONV Controlled:  None    Airway Patency:  Patent    Post Op Vitals Reviewed: Yes          Staff: CRNA           /89 (01/19/18 2007)    Temp      Pulse 84 (01/19/18 2007)   Resp 18 (01/19/18 2007)    SpO2 97 % (01/19/18 2007)

## 2018-01-20 NOTE — RESTORATIVE TECHNICIAN NOTE
Restorative Specialist Mobility Note       Activity: Bedrest, Dangle, Stand at bedside, Turn, Chair     Assistive Device: Other (Comment) (hand hold assist)     Ambulation Response:  Tolerated fairly well  Repositioned: Sitting, Up in chair

## 2018-01-20 NOTE — PLAN OF CARE
CARDIOVASCULAR - ADULT     Maintains optimal cardiac output and hemodynamic stability Progressing     Absence of cardiac dysrhythmias or at baseline rhythm Progressing        DISCHARGE PLANNING - CARE MANAGEMENT     Discharge to post-acute care or home with appropriate resources Progressing        Nutrition/Hydration-ADULT     Nutrient/Hydration intake appropriate for improving, restoring or maintaining nutritional needs Progressing        Potential for Falls     Patient will remain free of falls Progressing

## 2018-01-20 NOTE — PROGRESS NOTES
Progress Note - ICU Transfer to [address] [place] [address] Rd A Deltito 78 y o  male MRN: [id number]  [facility]   Unit/Bed#: [address] 933-19 Encounter: [id number]    Code Status: Level 1 - Full Code  POA:    POLST:      Reason for ICU admission: 3rd degree heart block     Active problems:   Principal Problem: Third degree heart block (HCC)  Active Problems:    MR (mental retardation)    Chest wall contusion  Resolved Problems:    * No resolved hospital problems  *      Consultants: Electrophysiology     History of Present Illness: Patient brought to the [facility] emergency department from his group home after falling in the shower  Per reports, he has been falling a lot the past few days  Patient was found to have 3rd degree heart block  Patient was transferred to [facility] for evaluation by electrophysiology  It was felt the patient did not need transvenous pacemaker as his blood pressure was stable  He had a dual chamber PPM placed on 1/19  Procedure was uneventful and was transferred back to the ICU  CXR was reviewed and no pneumothorax appreciated with PPM in adequate position  Patient wakes to verbal commands  Heart RRR, paced on telemetry  Lungs CTA B/L  Patient is stable for transfer out of ICU  Outstanding/pending diagnostics: Lyme pending     Mobilization Plan: LUE in sling per EP    Nutrition Plan: Dysphagia diet    Discharge Plan:   Patient should be ready for discharge to group [facility] on 1/20-1/21 after clearance by Ep  Specific Diagnosis Plan:    Complete heart block s/p PPM placement: management per EP    Spoke with Dr Gerardo Mendes regarding transfer  Please call critical care medicine with any questions or concerns  Portions of the record may have been created with voice recognition software  Occasional wrong word or "sound a like" substitutions may have occurred due to the inherent limitations of voice recognition software    Read the chart carefully and recognize, using context, where substitutions have occurred      Jasmina Bautista PA-C

## 2018-01-20 NOTE — PROGRESS NOTES
Cardiology Progress Note - Collette Jerome 78 y o  male MRN: [id number]    Unit/Bed#: [facility] 508-01 Encounter: [id number]      Assessment:  Principal Problem: Third degree heart block (HCC)  Active Problems:    MR (mental retardation)    Chest wall contusion    Overactive bladder    postop day 1 status post left-sided permanent pacemaker implantation    Plan:    Chest x-ray is stable  Postoperative device check is normal  Postoperative EKG shows appropriate a sensed V paced rhythm  Normal appearing device on examination  Overall clinically stable, can be discharged back to his group home when okay with primary service  We will sign off, please call with questions    Subjective:   Patient seen and examined  No significant events overnight  Meds/Allergies   No Known Allergies    Current Facility-Administered Medications:     acetaminophen (TYLENOL) tablet 650 mg, 650 mg, Oral, Q6H PRN, Brianna Burrell PA-C, 650 mg at 01/20/18 0651    citalopram (CeleXA) tablet 10 mg, 10 mg, Oral, Daily, Lorririan Rodriguez PA-C, 10 mg at 01/20/18 3060    enoxaparin (LOVENOX) subcutaneous injection 40 mg, 40 mg, Subcutaneous, Q24H, LorriGISSELLE Taylor-MELVIN, 40 mg at 01/19/18 2041    metoclopramide (REGLAN) injection 10 mg, 10 mg, Intravenous, Once PRN, Marilin Hsieh MD    oxybutynin (DITROPAN) tablet 5 mg, 5 mg, Oral, BID, Minda Vann PA-C    oxyCODONE-acetaminophen (PERCOCET) 5-325 mg per tablet 1 tablet, 1 tablet, Oral, Q4H PRN, Kusum Nava PA-C    risperiDONE (RisperDAL) tablet 1 mg, 1 mg, Oral, BID, LorriGISSELLE Taylor-MELVIN, 1 mg at 01/20/18 3577    Objective   Vitals: Blood pressure 103/71, pulse 85, temperature 98 1 °F (36 7 °C), resp   rate 18, height 5' 2" (1 575 m), weight 54 5 kg (120 lb 2 4 oz), SpO2 91 % , Body mass index is 21 98 kg/m² , Orthostatic Blood Pressures    Flowsheet Row Most Recent Value   Blood Pressure  103/71 filed at 01/20/2018 1034   Patient Position - Orthostatic VS  Lying filed at 01/18/2018 4274        Systolic (28JCS), YEF:831 , Min:92 , YEYO:783     Diastolic (12UCF), HZB:80, Min:56, Max:100      Intake/Output Summary (Last 24 hours) at 01/20/18 1059  Last data filed at 01/20/18 0900   Gross per 24 hour   Intake              560 ml   Output              850 ml   Net             -290 ml     Weight (last 2 days)     Date/Time   Weight    01/19/18 0500  54 5 (120 15)              No significant arrhythmias seen on telemetry review       Physical Exam:    GEN: Alyson Jerome appears well, alert and in no apparent distress  CHEST:  Bandaged left upper chest pacemaker site, no induration, no redness, no warmth, no tenderness  HEART: normal rate, regular rhythm, normal S1 and S2, no murmurs, clicks, gallops or rubs   LUNGS: clear to auscultation bilaterally; no wheezes, rales, or rhonchi   ABDOMEN: normal bowel sounds, soft, no tenderness, no distention  EXTREMITIES: peripheral pulses normal; no clubbing, cyanosis, or edema      Laboratory Results:    Results from last 7 days  Lab Units 01/18/18  2109   TROPONIN I ng/mL <0 02       CBC with diff:   Results from last 7 days  Lab Units 01/20/18  0444 01/19/18  0519 01/18/18  2109   WBC Thousand/uL 10 97* 7 66 7 18   HEMOGLOBIN g/dL 15 1 15 6 15 1   HEMATOCRIT % 44 0 44 6 45 5   MCV fL 90 89 92   PLATELETS Thousands/uL 187 207 204   MCH pg 30 9 31 0 30 6   MCHC g/dL 34 3 35 0 33 2   RDW % 12 6 12 6 12 6   MPV fL 9 4 9 6 9 3   NRBC AUTO /100 WBCs  --  0  --          CMP:  Results from last 7 days  Lab Units 01/20/18  0444 01/19/18  0519 01/18/18  2109   SODIUM mmol/L 141 141 145   POTASSIUM mmol/L 3 9 3 8 4 2   CHLORIDE mmol/L 110* 108 107   CO2 mmol/L 23 25 31   ANION GAP mmol/L 8 8 7   BUN mg/dL 19 22 28*   CREATININE mg/dL 1 08 1 05 1 40*   GLUCOSE RANDOM mg/dL 122 85 95   CALCIUM mg/dL 8 3 8 8 9 0   EGFR ml/min/1 73sq m 71 74 52         BMP:  Results from last 7 days  Lab Units 01/20/18  0444 01/19/18  0519 01/18/18  2109   SODIUM mmol/L 141 141 145   POTASSIUM mmol/L 3 9 3 8 4 2   CHLORIDE mmol/L 110* 108 107   CO2 mmol/L 23 25 31   BUN mg/dL 19 22 28*   CREATININE mg/dL 1 08 1 05 1 40*   GLUCOSE RANDOM mg/dL 122 85 95   CALCIUM mg/dL 8 3 8 8 9 0       Magnesium:   Results from last 7 days  Lab Units 18  0519 18  2109   MAGNESIUM mg/dL 2 2 2 1       Coags:   Results from last 7 days  Lab Units 18  0444 18  0519 18  2109   PTT seconds  --  32 30   INR  1 20* 1 12 1 01         Cardiac testing:   Results for orders placed during the hospital encounter of 18   Echo complete with contrast if indicated    Narrative [facility] [address]  [phone]    Transthoracic Echocardiogram  Limited 2D, Doppler, and Color Doppler    Study date:  2018    Patient: Rasheeda Sagastume  MR number: [id number]  Account number: [de-identified]  : 1950  Age: 78 years  Gender: Male  Status: Inpatient  Location: Bedside  Height: 62 in  Weight: 119 7 lb  BP: 120/ 74 mmHg    Indications: Complete Heart Block    Diagnoses: I51 9 - Heart disease, unspecified    Sonographer:  GETACHEW Mancuso, RDCS  Primary Physician:  Familia Abarca MD  Referring Physician:  Olivia Hameed MD  Group:  [facility]  Interpreting Physician:  Soco Marquez MD    SUMMARY    LEFT VENTRICLE:  Size was normal   Systolic function was normal  Ejection fraction was estimated to be 60 %  Wall thickness was normal   Doppler parameters were consistent with abnormal left ventricular relaxation (grade 1 diastolic dysfunction)  RIGHT VENTRICLE:  The size was at the upper limits of normal   Systolic function was normal     MITRAL VALVE:  There was trace regurgitation  HISTORY: PRIOR HISTORY: Complete Heart Block, Tachycardia, Deafness, Non-verbal, Left chest wall contusion, Mental Retardation    PROCEDURE: The procedure was performed at the bedside  This was a routine study   The transthoracic approach was used  The study included limited 2D imaging, limited spectral Doppler, and color Doppler  The heart rate was 83 bpm, at the  start of the study  Image quality was adequate  LEFT VENTRICLE: Size was normal  Systolic function was normal  Ejection fraction was estimated to be 60 %  There were no regional wall motion abnormalities  Wall thickness was normal  DOPPLER: Doppler parameters were consistent with  abnormal left ventricular relaxation (grade 1 diastolic dysfunction)  RIGHT VENTRICLE: The size was at the upper limits of normal  Systolic function was normal  Wall thickness was normal     LEFT ATRIUM: Size was normal     RIGHT ATRIUM: Size was normal     MITRAL VALVE: Valve structure was normal  There was normal leaflet separation  DOPPLER: The transmitral velocity was within the normal range  There was no evidence for stenosis  There was trace regurgitation  AORTIC VALVE: The valve was trileaflet  Leaflets exhibited normal thickness and normal cuspal separation  DOPPLER: Transaortic velocity was within the normal range  There was no evidence for stenosis  There was no regurgitation  TRICUSPID VALVE: Not assessed  PULMONIC VALVE: Not well visualized  PERICARDIUM: There was no pericardial effusion  The pericardium was normal in appearance  AORTA: The root exhibited normal size  SYSTEMIC VEINS: IVC: The inferior vena cava was normal in size and course   Respirophasic changes were normal     SYSTEM MEASUREMENT TABLES    2D  LA Area: 14 04 cm2  LVEDV MOD A4C: 74 92 ml  LVEF MOD A4C: 61 32 %  LVESV MOD A4C: 28 98 ml  LVLd A4C: 7 86 cm  LVLs A4C: 6 49 cm  RA Area: 15 13 cm2  SV MOD A4C: 45 94 ml  rv diam: 4 48 cm    MM  TAPSE: 2 27 cm    PW  E': 0 1 m/s  E/E': 5 22  MV A Terence: 0 69 m/s  MV Dec [place]: 1 65 m/s2  MV DecT: 308 24 ms  MV E Terence: 0 51 m/s  MV E/A Ratio: 0 73    [employer] Accredited Echocardiography Laboratory    Prepared and electronically signed by    Sophia Garibay MD  Signed 19-Jan-2018 09:58:07         EKG personally reviewed by Nenita Miguel MD   Sinus rhythm, a sensed V paced      Nenita Miguel MD    Portions of the record may have been created with voice recognition software   Occasional wrong word or "sound a like" substitutions may have occurred due to the inherent limitations of voice recognition software   Read the chart carefully and recognize, using context, where substitutions have occurred

## 2018-01-20 NOTE — ASSESSMENT & PLAN NOTE
· Will return to group home when able (group home team member present, needs 24 hr post-procedure prior to taking back to facility)  · Plan for d/c tomorrow  · Continue home medications (risperdal)

## 2018-01-21 VITALS
OXYGEN SATURATION: 94 % | WEIGHT: 120.15 LBS | RESPIRATION RATE: 18 BRPM | HEIGHT: 62 IN | SYSTOLIC BLOOD PRESSURE: 132 MMHG | DIASTOLIC BLOOD PRESSURE: 78 MMHG | BODY MASS INDEX: 22.11 KG/M2 | TEMPERATURE: 98.3 F | HEART RATE: 100 BPM

## 2018-01-21 RX ADMIN — ACETAMINOPHEN 650 MG: 325 TABLET, FILM COATED ORAL at 08:02

## 2018-01-21 RX ADMIN — CITALOPRAM HYDROBROMIDE 10 MG: 10 TABLET ORAL at 08:02

## 2018-01-21 RX ADMIN — OXYBUTYNIN CHLORIDE 5 MG: 5 TABLET ORAL at 08:03

## 2018-01-21 RX ADMIN — RISPERIDONE 1 MG: 1 TABLET ORAL at 08:03

## 2018-01-21 NOTE — DISCHARGE SUMMARY
[address] [facility]  Discharge- Andrez Jerome 1950, 78 y o  male MRN: [id number]  Unit/Bed#: [facility] 508-01 Encounter: [id number]  Primary Care Provider: Blaine Cobb MD   Date and time admitted to hospital: 1/18/2018 11:08 PM    * Third degree heart block [facility])   Assessment & Plan    · S/P PPM 1/18  · Pacemaker interrogation performed- no events  · CXR post insertion, was negative  · Keep incision dry for one week  Do not use lotions/powders/creams on incision  Remove outer bandage 48 hours after implantation  Leave underlying steri-strips in place, they will either fall off on their own or will be removed at 2 week follow up appointment  MR (mental retardation)   Assessment & Plan    · Continue home medications (risperdal)        Overactive bladder   Assessment & Plan    · Continue oxybutinin            Consultations During Hospital Stay:  · Cardiology Dr Kirti Dwyer     Procedures Performed:     CXR: 1/18: No active pulmonary disease  CXR: 1/19: No visible pneumothorax  No visible rib fracture  CXR: 1/19:  Stable placement of left chest cardiac pacemaker  No acute cardiopulmonary process  CXR: 1/20: No active pulmonary disease  Significant Findings:     · Third degree heart block s/p PPM  · HOWIE on admission, resolved  Incidental Findings:   · Third degree heart block     Test Results Pending at Discharge (will require follow up): · None     Outpatient Tests Requested:  · Follow up with EP    Complications:  none    Reason for Admission: [facility] Course:     Sandra Brock is a 78 y o  male patient who originally presented to the hospital on 1/18/2018 due to falling in the shower at the group home  Patient has a history of mental retardation and overactive bladder  The patient was noted to be seen in the emergency department at [facility] and was noted to have third-degree heart block    The patient was subsequently transferred to [facility] and admitted on 1/18/2018 to the ICU  The patient was noted to be taken to the cardiac cath lab and pacemaker was placed  The patients pacemaker was interrogated and CXR post PPM was obtained and there were no events  Please see above list of diagnoses and related plan for additional information  Condition at Discharge: stable     Discharge Day Visit / Exam:     Subjective:  No acute events overnight  Patient laying in bed, offers no complaints  Does not appear to be in pain  States " I am fine "  Vitals: Blood Pressure: 132/78 (01/21/18 0715)  Pulse: 100 (01/21/18 0715)  Temperature: 98 3 °F (36 8 °C) (01/21/18 0715)  Temp Source: Oral (01/21/18 0300)  Respirations: 18 (01/21/18 0715)  Height: 5' 2" (157 5 cm) (01/19/18 0500)  Weight - Scale: 54 5 kg (120 lb 2 4 oz) (01/19/18 0500)  SpO2: 94 % (01/21/18 0715)    Exam:   Physical Exam   Constitutional: No distress  HENT:   Head: Normocephalic and atraumatic  Eyes: Conjunctivae are normal    Neck: No JVD present  Cardiovascular: Normal rate, regular rhythm, normal heart sounds and intact distal pulses  Pulmonary/Chest: Effort normal and breath sounds normal  No respiratory distress  He has no wheezes  He has no rales  Abdominal: Soft  Bowel sounds are normal  He exhibits no distension  There is no tenderness  There is no rebound and no guarding  Musculoskeletal: He exhibits no edema  Neurological: He is alert  Contracted right arm  Patient will state "I am okay" but no other speech  Takes deep breaths on command  Skin: Skin is warm  No rash noted  He is not diaphoretic  No erythema  Nursing note and vitals reviewed  Discussion with Family: Sister via phone    Discharge instructions/Information to patient and family:   See after visit summary for information provided to patient and family  Provisions for Follow-Up Care:  See after visit summary for information related to follow-up care and any pertinent home health orders  Disposition:     Home with [facility] Services (Reminder: Complete face to face encounter)  To group home with [facility]NA    For Discharges to [facility]:   · Not Applicable to this Patient - Not Applicable to this Patient    Planned Readmission: no     Discharge Statement:  I spent 24 minutes discharging the patient  This time was spent on the day of discharge  I had direct contact with the patient on the day of discharge  Greater than 50% of the total time was spent examining patient, answering all patient questions, arranging and discussing plan of care with patient as well as directly providing post-discharge instructions  Additional time then spent on discharge activities  Discharge Medications:  See after visit summary for reconciled discharge medications provided to patient and family        ** Please Note: This note has been constructed using a voice recognition system **

## 2018-01-21 NOTE — ASSESSMENT & PLAN NOTE
· S/P PPM 1/18  · Pacemaker interrogation performed- no events  · CXR post insertion, was negative  · Keep incision dry for one week  Do not use lotions/powders/creams on incision  Remove outer bandage 48 hours after implantation  Leave underlying steri-strips in place, they will either fall off on their own or will be removed at 2 week follow up appointment

## 2018-01-22 LAB
B BURGDOR IGG SER IA-ACNC: 0.13
B BURGDOR IGM SER IA-ACNC: 0.88

## 2018-01-24 LAB

## 2018-02-06 ENCOUNTER — CLINICAL SUPPORT (OUTPATIENT)
Dept: CARDIOLOGY CLINIC | Facility: CLINIC | Age: 68
End: 2018-02-06
Payer: COMMERCIAL

## 2018-02-06 DIAGNOSIS — I44.2 CHB (COMPLETE HEART BLOCK) (HCC): Primary | ICD-10-CM

## 2018-02-06 DIAGNOSIS — Z95.0 PRESENCE OF PERMANENT CARDIAC PACEMAKER: ICD-10-CM

## 2018-02-06 PROCEDURE — 99024 POSTOP FOLLOW-UP VISIT: CPT | Performed by: INTERNAL MEDICINE

## 2018-02-06 PROCEDURE — 93288 INTERROG EVL PM/LDLS PM IP: CPT | Performed by: INTERNAL MEDICINE

## 2018-02-06 NOTE — PROGRESS NOTES
DEVICE INTERROGATED IN THE [place] OFFICE   BATTERY VOLTAGE ADEQUATE (12 YR)    AP 8 6%   99 9% (>40%/CHB/DEPENDENT)   ALL AVAILABLE LEAD PARAMETERS WITHIN NORMAL LIMITS   NO HIGH RATE EPISODES   NO PROGRAMMING CHANGES MADE TO DEVICE PARAMETERS   WOUND CHECK: INCISION CLEAN AND DRY WITH EDGES APPROXIMATED;  WOUND CARE AND RESTRICTIONS REVIEWED WITH PATIENT   NORMAL DEVICE FUNCTION  Jennifer Best

## 2018-02-14 ENCOUNTER — OFFICE VISIT (OUTPATIENT)
Dept: CARDIOLOGY CLINIC | Facility: CLINIC | Age: 68
End: 2018-02-14

## 2018-02-14 VITALS
HEART RATE: 70 BPM | BODY MASS INDEX: 22.08 KG/M2 | SYSTOLIC BLOOD PRESSURE: 102 MMHG | DIASTOLIC BLOOD PRESSURE: 78 MMHG | WEIGHT: 120 LBS | HEIGHT: 62 IN

## 2018-02-14 DIAGNOSIS — I44.2 HEART BLOCK AV THIRD DEGREE (HCC): Primary | ICD-10-CM

## 2018-02-14 PROCEDURE — 99024 POSTOP FOLLOW-UP VISIT: CPT | Performed by: INTERNAL MEDICINE

## 2018-02-14 NOTE — PROGRESS NOTES
Postop follow-up site check pacemaker working appropriately to  Incision well healed he will follow up with me on a p r n   Basis and with our device clinic on a regular basis

## 2018-02-15 ENCOUNTER — APPOINTMENT (OUTPATIENT)
Dept: RADIOLOGY | Age: 68
End: 2018-02-15
Payer: COMMERCIAL

## 2018-02-15 ENCOUNTER — TRANSCRIBE ORDERS (OUTPATIENT)
Dept: ADMINISTRATIVE | Age: 68
End: 2018-02-15

## 2018-02-15 DIAGNOSIS — M54.5 LOW BACK PAIN, UNSPECIFIED BACK PAIN LATERALITY, UNSPECIFIED CHRONICITY, WITH SCIATICA PRESENCE UNSPECIFIED: ICD-10-CM

## 2018-02-15 DIAGNOSIS — M79.669 PAIN OF LOWER LEG, UNSPECIFIED LATERALITY: Primary | ICD-10-CM

## 2018-02-15 DIAGNOSIS — M79.669 PAIN OF LOWER LEG, UNSPECIFIED LATERALITY: ICD-10-CM

## 2018-02-15 PROCEDURE — 73552 X-RAY EXAM OF FEMUR 2/>: CPT

## 2018-02-15 PROCEDURE — 72110 X-RAY EXAM L-2 SPINE 4/>VWS: CPT

## 2018-03-08 ENCOUNTER — TELEPHONE (OUTPATIENT)
Dept: NEUROLOGY | Facility: CLINIC | Age: 68
End: 2018-03-08

## 2018-04-02 ENCOUNTER — TELEPHONE (OUTPATIENT)
Dept: NEUROLOGY | Facility: CLINIC | Age: 68
End: 2018-04-02

## 2018-04-19 ENCOUNTER — IN-CLINIC DEVICE VISIT (OUTPATIENT)
Dept: CARDIOLOGY CLINIC | Facility: CLINIC | Age: 68
End: 2018-04-19
Payer: COMMERCIAL

## 2018-04-19 DIAGNOSIS — Z95.0 CARDIAC PACEMAKER IN SITU: ICD-10-CM

## 2018-04-19 DIAGNOSIS — I44.2 AV BLOCK, COMPLETE (HCC): Primary | ICD-10-CM

## 2018-04-19 PROCEDURE — 93280 PM DEVICE PROGR EVAL DUAL: CPT | Performed by: INTERNAL MEDICINE

## 2018-04-19 NOTE — PROGRESS NOTES
MDT DUAL CHAMBER PACEMAKER   DEVICE INTERROGATED IN THE [place] OFFICE  BATTERY VOLTAGE ADEQUATE (11 5 YRS)  AP 5%  - 100% (>40%/AVB - DEPENDENT)  ALL LEAD PARAMETERS TEST WITHIN NORMAL LIMITS & STABLE  ALL OTHER TESTING WITHIN NORMAL LIMITS  NO SIGNIFICANT HIGH RATE EPISODES  DECREASE MADE TO RA & RV AMPLITUDE TO PROMOTE DEVICE LONGEVITY WHILE MAINTAINING AN APPROPRIATE SAFETY MARGIN  NORMAL DEVICE FUNCTION     eb

## 2018-05-15 ENCOUNTER — TELEPHONE (OUTPATIENT)
Dept: NEUROLOGY | Facility: CLINIC | Age: 68
End: 2018-05-15

## 2018-08-24 ENCOUNTER — OFFICE VISIT (OUTPATIENT)
Dept: URGENT CARE | Age: 68
End: 2018-08-24
Payer: COMMERCIAL

## 2018-08-24 VITALS
SYSTOLIC BLOOD PRESSURE: 110 MMHG | OXYGEN SATURATION: 98 % | DIASTOLIC BLOOD PRESSURE: 80 MMHG | HEART RATE: 76 BPM | BODY MASS INDEX: 23.56 KG/M2 | RESPIRATION RATE: 20 BRPM | HEIGHT: 60 IN | WEIGHT: 120 LBS | TEMPERATURE: 98 F

## 2018-08-24 DIAGNOSIS — S40.021A CONTUSION OF ARM, RIGHT, INITIAL ENCOUNTER: Primary | ICD-10-CM

## 2018-08-24 PROCEDURE — 99213 OFFICE O/P EST LOW 20 MIN: CPT | Performed by: FAMILY MEDICINE

## 2018-08-24 NOTE — PROGRESS NOTES
[facility]    NAME: You Jerome is a 80 y o  male  : 1950    MRN: [id number]  DATE: 2018  TIME: 7:38 AM    Assessment and Plan   Contusion of arm, right, initial encounter [S40 021A]  1  Contusion of arm, right, initial encounter         Patient Instructions   Patient Instructions   See copy of group home form filed in chart for instructions      Chief Complaint     Chief Complaint   Patient presents with    Rash     Pt is from group home and caretaker brought pt  for check of reddened area anticubital area right elbow  No known injury  History of Present Illness   Anny Longo presents to the clinic c/o  79 yo male who lives in group home brought in by care giver for discoloration R  Arm at bend of elbow  Pt was at sister's house for the last 2 days and sister was not aware of any injury or problem prior to him being picked up this morning  Care giver believes that it may be from him pushing wheels on wheel chair as area is where arm hits wheelchair arm rest     Pt does not seem to be in any discomfort  He is using arm w/o difficulty  He is RHD  Review of Systems   Review of Systems   Unable to perform ROS: Other   Constitutional: Negative for activity change, appetite change, fatigue and fever  Respiratory: Negative  Cardiovascular: Negative  Musculoskeletal: Positive for gait problem  Negative for arthralgias and joint swelling  Skin: Positive for color change  Hematological: Negative for adenopathy         Current Medications     Long-Term Prescriptions   Medication Sig Dispense Refill    Butenafine HCl (LOTRIMIN ULTRA) 1 % cream Apply topically      Calcium Carbonate-Vitamin D 600-400 MG-UNIT per chew tablet Chew 1 tablet 2 (two) times a day      calcium polycarbophil (FIBERCON) 625 mg tablet Take 625 mg by mouth daily      cetirizine (ZyrTEC) 10 mg tablet Take 10 mg by mouth daily      citalopram (CeleXA) 20 mg tablet Take 10 mg by mouth daily      Miconazole Nitrate 2 % AERP Apply topically      oxybutynin (DITROPAN) 5 mg tablet Take 5 mg by mouth 2 (two) times a day      risperiDONE (RisperDAL) 1 mg tablet Take 1 mg by mouth 2 (two) times a day      terazosin (HYTRIN) 5 mg capsule Take 5 mg by mouth daily at bedtime         Current Allergies     Allergies as of 08/24/2018    (No Known Allergies)          The following portions of the patient's history were reviewed and updated as appropriate: allergies, current medications, past family history, past medical history, past social history, past surgical history and problem list   Past Medical History:   Diagnosis Date    Developmental non-verbal disorder     Dysphagia     moderate pharyngeal dysphagia    Gait disturbance     Hearing loss     Intellectual disability     Osteoarthritis     Osteoporosis     Prostate atrophy      No past surgical history on file  No family history on file  Objective   /80   Pulse 76   Temp 98 °F (36 7 °C) (Temporal)   Resp 20   Ht 5' (1 524 m)   Wt 54 4 kg (120 lb)   SpO2 98%   BMI 23 44 kg/m²        Physical Exam     Physical Exam   Constitutional: He appears well-developed and well-nourished  No distress  Cardiovascular: Normal rate  Pulmonary/Chest: Effort normal    Musculoskeletal:        Arms:  Lymphadenopathy:     He has no cervical adenopathy  Neurological: He is alert  Skin: He is not diaphoretic  There is erythema  3 x 5 5 centimeter purplish plaque right antecubital area that blanches  Mild irregular borders  No gross swelling, abrasions  No evidence of insect bite or open wound  Nursing note and vitals reviewed

## 2018-09-21 ENCOUNTER — TRANSCRIBE ORDERS (OUTPATIENT)
Dept: ADMINISTRATIVE | Age: 68
End: 2018-09-21

## 2018-09-21 ENCOUNTER — APPOINTMENT (OUTPATIENT)
Dept: RADIOLOGY | Age: 68
End: 2018-09-21
Payer: COMMERCIAL

## 2018-09-21 DIAGNOSIS — R10.9 ABDOMINAL PAIN, UNSPECIFIED ABDOMINAL LOCATION: Primary | ICD-10-CM

## 2018-09-21 DIAGNOSIS — R10.9 ABDOMINAL PAIN, UNSPECIFIED ABDOMINAL LOCATION: ICD-10-CM

## 2018-09-21 PROCEDURE — 74018 RADEX ABDOMEN 1 VIEW: CPT

## 2018-09-27 ENCOUNTER — TRANSCRIBE ORDERS (OUTPATIENT)
Dept: ADMINISTRATIVE | Facility: HOSPITAL | Age: 68
End: 2018-09-27

## 2018-09-27 DIAGNOSIS — R93.5 ABNORMAL X-RAY OF ABDOMEN: Primary | ICD-10-CM

## 2018-10-03 ENCOUNTER — HOSPITAL ENCOUNTER (OUTPATIENT)
Dept: RADIOLOGY | Age: 68
Discharge: HOME | End: 2018-10-03
Payer: COMMERCIAL

## 2018-10-03 DIAGNOSIS — R93.5 ABNORMAL X-RAY OF ABDOMEN: ICD-10-CM

## 2018-10-03 PROCEDURE — 74160 CT ABDOMEN W/CONTRAST: CPT

## 2018-10-03 RX ADMIN — IOHEXOL 100 ML: 350 INJECTION, SOLUTION INTRAVENOUS at 11:59

## 2018-10-10 ENCOUNTER — OFFICE VISIT (OUTPATIENT)
Dept: AUDIOLOGY | Age: 68
End: 2018-10-10
Payer: COMMERCIAL

## 2018-10-10 DIAGNOSIS — H90.3 SENSORINEURAL HEARING LOSS, BILATERAL: Primary | ICD-10-CM

## 2018-10-10 PROCEDURE — 92567 TYMPANOMETRY: CPT | Performed by: AUDIOLOGIST

## 2018-10-10 PROCEDURE — 92555 SPEECH THRESHOLD AUDIOMETRY: CPT | Performed by: AUDIOLOGIST

## 2018-10-10 PROCEDURE — 92592 HB HEARING AID CHECK ONE EAR: CPT | Performed by: AUDIOLOGIST

## 2018-10-10 NOTE — LETTER
October 10, 2018     Devon Berg MD  [address] [facility] [place] [address]    Patient: Ad Jerome   YOB: 1950   Date of Visit: 10/10/2018       Dear Dr Saul Ignacio:    Thank you for referring Kirsten Barrow to me for evaluation  Below are my notes for this consultation  If you have questions, please do not hesitate to call me  I look forward to following your patient along with you  Sincerely,        Yadi Cornelius        CC: No Recipients  Yadi Cornelius  10/10/2018 11:24 AM  Sign at close encounter  HEARING EVALUATION    Name:  Ad Jerome  :  1950  Age:  80 y o  Date of Evaluation: 10/10/18     History: known bilateral hearing loss, wears right hearing aid  Reason for visit: Garfield Pisano is being seen today at the request of Dr Saul Ignacio for an evaluation of hearing  EVALUATION:    Otoscopic Evaluation:   Right Ear: Could not visualize tympanic membrane, excessive cerumen   Left Ear: Could not visualize tympanic membrane, excessive cerumen    Tympanometry:   Right: Type As - reduced compliance   Left: Type As - reduced compliance    Audiogram Results:  Performed speech audiometry - responses poorer than last test  Discontinued testing  *see attached audiogram         Hearing Aid Visit:    Name:  Ad Jerome  :  1950  Age:  80 y o  Date of Evaluation: 10/10/18     Ad Jerome is being seen for a hearing aid visit  Ad Jerome   is fit in the left ear only with Luevenia Harshad SP4 hearing aid(s) serial number [id number] right  Warranty 3/7/18  Caregiver reports concern over battery drain  Action:  Earmold was retubed  Hearing aid was cleaned and checked and appears to be working well  Gave caregiver  and advised her to monitor battery life  If concern continues we can send in for repair, but not under warranty      RECOMMENDATIONS:  Return to [facility]  for F/U, Ear Cleaning and Copy to Patient/Caregiver, Hearing evaluation has been scheduled following ear cleaning  We will verify insurance for consideration of new hearing aid  PATIENT EDUCATION:   Discussed results and recommendations with caregiver  Questions were addressed and thecaregiver was encouraged to contact our department should concerns arise        Christin Juárez   Clinical Audiologist

## 2018-10-10 NOTE — PROGRESS NOTES
HEARING EVALUATION    Name:  Martine Jerome  :  1950  Age:  80 y o  Date of Evaluation: 10/10/18     History: known bilateral hearing loss, wears right hearing aid  Reason for visit: Jesu Orantes is being seen today at the request of Dr Akin Joe for an evaluation of hearing  EVALUATION:    Otoscopic Evaluation:   Right Ear: Could not visualize tympanic membrane, excessive cerumen   Left Ear: Could not visualize tympanic membrane, excessive cerumen    Tympanometry:   Right: Type As - reduced compliance   Left: Type As - reduced compliance    Audiogram Results:  Performed speech audiometry - responses poorer than last test  Discontinued testing  *see attached audiogram         Hearing Aid Visit:    Name:  Martine Jerome  :  1950  Age:  80 y o  Date of Evaluation: 10/10/18     Martine Jerome is being seen for a hearing aid visit  Martine Jerome   is fit in the left ear only with Blondell Sierras SP4 hearing aid(s) serial number [id number] right  Warranty 3/7/18  Caregiver reports concern over battery drain  Action:  Earmold was retubed  Hearing aid was cleaned and checked and appears to be working well  Gave caregiver  and advised her to monitor battery life  If concern continues we can send in for repair, but not under warranty  RECOMMENDATIONS:  Return to [facility]  for F/U, Ear Cleaning and Copy to Patient/Caregiver, Hearing evaluation has been scheduled following ear cleaning  We will verify insurance for consideration of new hearing aid  PATIENT EDUCATION:   Discussed results and recommendations with caregiver  Questions were addressed and thecaregiver was encouraged to contact our department should concerns arise        Christin Good Ma   Clinical Audiologist

## 2018-10-17 ENCOUNTER — OFFICE VISIT (OUTPATIENT)
Dept: CARDIOLOGY CLINIC | Facility: CLINIC | Age: 68
End: 2018-10-17
Payer: COMMERCIAL

## 2018-10-17 ENCOUNTER — TELEPHONE (OUTPATIENT)
Dept: CARDIOLOGY CLINIC | Facility: CLINIC | Age: 68
End: 2018-10-17

## 2018-10-17 ENCOUNTER — IN-CLINIC DEVICE VISIT (OUTPATIENT)
Dept: CARDIOLOGY CLINIC | Facility: CLINIC | Age: 68
End: 2018-10-17
Payer: COMMERCIAL

## 2018-10-17 VITALS
HEIGHT: 60 IN | HEART RATE: 84 BPM | WEIGHT: 128 LBS | DIASTOLIC BLOOD PRESSURE: 72 MMHG | BODY MASS INDEX: 25.13 KG/M2 | SYSTOLIC BLOOD PRESSURE: 108 MMHG

## 2018-10-17 DIAGNOSIS — I44.2 HEART BLOCK AV THIRD DEGREE (HCC): Primary | ICD-10-CM

## 2018-10-17 DIAGNOSIS — Z95.0 CARDIAC PACEMAKER IN SITU: ICD-10-CM

## 2018-10-17 DIAGNOSIS — I44.2 THIRD DEGREE HEART BLOCK (HCC): ICD-10-CM

## 2018-10-17 PROCEDURE — 93288 INTERROG EVL PM/LDLS PM IP: CPT | Performed by: INTERNAL MEDICINE

## 2018-10-17 PROCEDURE — 4040F PNEUMOC VAC/ADMIN/RCVD: CPT | Performed by: PHYSICIAN ASSISTANT

## 2018-10-17 PROCEDURE — 99214 OFFICE O/P EST MOD 30 MIN: CPT | Performed by: PHYSICIAN ASSISTANT

## 2018-10-17 PROCEDURE — 93000 ELECTROCARDIOGRAM COMPLETE: CPT | Performed by: PHYSICIAN ASSISTANT

## 2018-10-17 NOTE — TELEPHONE ENCOUNTER
Pt has appointment with Hu Fry today for Pre Op Clearance and per April, , [facility] requires that a physician sign off on the pre op clearance for pt  I will put a Pre Op Cardiac Risk Assessment sheet with the chart today, then Dr Disha Leigh will sign off on tequila'w

## 2018-10-17 NOTE — PROGRESS NOTES
Cardiology Follow Up    Coreen Jerome  1950  [id number]  HEART & VASCULAR [facility]  [facility]  [address]        Assessment/Plan     1  Heart block AV third degree (HCC)  POCT ECG   2  Third degree heart block (HCC)     3  Cardiac pacemaker in situ         ASSESSMENT:  1  Symptomatic complete heart block, status post Medtronic dual-chamber pacemaker implantation 01/2018   2  Normal LV systolic function per echo 01/2018   3  Developmental delay/Mental retardation  4  Need for gallbladder surgery    DISCUSSION/SUMMARY:  1  Complete heart block with Medtronic dual-chamber pacemaker in situ - device interrogation today shows appropriate function including lead sensing, threshold, and impedances  There was 1 atrial high rate episode noted, however no EGMs were available for review, unclear if this was a slow atrial arrhythmia verses over sensing  Will continue to monitor  His incision is well healed without erythema or signs of infection  2   Normal LV systolic function - he is euvolemic today  3   Need for gallbladder surgery - per the patient's aide, he is scheduled for a lap sejal as soon as this Friday  While he is largely nonverbal, she is able to communicate with him and he denies significant cardiac complaints  Specifically he denies chest pain, palpitations, or significant shortness of breath  She has also not noted any symptoms, and denies that he is short of breath  He typically uses a wheelchair, but is able to walk  He has a history of gait dysfunction and falls  There is no cardiac contraindication to him having the procedure  If any electrocautery is going to be used, recommend placing a magnet over the device during the procedure as he is pacemaker dependent  We will fill out his clearance paperwork, and will fax this to [facility] where he is having the surgery performed    He can follow up with electrophysiology on an as-needed basis, will continue to monitor his pacemaker through routine device interrogations  I asked him and his aide to contact us with any questions, concerns, or changes in symptoms  History of Present Illness     HPI/INTERVAL HISTORY: Ozzie Sanchez is a 80 y o  male with a history of  Developmental delay and gait dysfunction  He was seen in the hospital 01/2018 for symptomatic complete heart block, at which time he had a Medtronic dual-chamber pacemaker implanted  His device has been monitored, and it is functioning appropriately  He is being seen today for cardiac clearance for upcoming lap cholecystectomy  While he is largely nonverbal, his aide is with him and she reports that recently he has been complaining of abdominal pain, but has not had any cardiac complaints  Specifically he denies chest pain, shortness of breath, dizziness, or palpitations  She has not noted issues with volume overload  He is currently not maintained on any cardiac medications  Echocardiogram performed prior to his pacemaker implantation showed normal LV systolic function  Review of Systems   Per written review of systems in the paper chart, this is positive for nausea / vomiting, abdominal pain, arthritis, depression, hearing problems, and recurrent UTIs  Otherwise ROS as noted above, all other 12 point review of systems was performed and is negative  Historical Information   Social History     Social History    Marital status: Single     Spouse name: N/A    Number of children: N/A    Years of education: N/A     Occupational History    Not on file       Social History Main Topics    Smoking status: Never Smoker    Smokeless tobacco: Never Used    Alcohol use No    Drug use: No    Sexual activity: Not on file     Other Topics Concern    Not on file     Social History Narrative    No narrative on file     Past Medical History:   Diagnosis Date    Developmental non-verbal disorder     Dysphagia     moderate pharyngeal dysphagia    Gait disturbance     Hearing loss     Intellectual disability     Osteoarthritis     Osteoporosis     Prostate atrophy      No past surgical history on file  History   Alcohol Use No     History   Drug Use No     History   Smoking Status    Never Smoker   Smokeless Tobacco    Never Used     No family history on file  Meds/Allergies       Current Outpatient Prescriptions:     acetaminophen (TYLENOL) 325 mg tablet, Take 325 mg by mouth every 6 (six) hours as needed for mild pain, Disp: , Rfl:     Butenafine HCl (LOTRIMIN ULTRA) 1 % cream, Apply topically, Disp: , Rfl:     Calcium Carbonate-Vitamin D 600-400 MG-UNIT per chew tablet, Chew 1 tablet 2 (two) times a day, Disp: , Rfl:     calcium polycarbophil (FIBERCON) 625 mg tablet, Take 625 mg by mouth daily, Disp: , Rfl:     cetirizine (ZyrTEC) 10 mg tablet, Take 10 mg by mouth daily, Disp: , Rfl:     citalopram (CeleXA) 20 mg tablet, Take 20 mg by mouth daily  , Disp: , Rfl:     guaiFENesin (ROBITUSSIN) 100 MG/5ML oral liquid, Take 200 mg by mouth 3 (three) times a day as needed for cough, Disp: , Rfl:     loperamide (IMODIUM A-D) 2 MG tablet, Take by mouth 4 (four) times a day as needed for diarrhea  , Disp: , Rfl:     Miconazole Nitrate 2 % AERP, Apply topically, Disp: , Rfl:     oxybutynin (DITROPAN) 5 mg tablet, Take 5 mg by mouth 2 (two) times a day, Disp: , Rfl:     PROLIA 60 MG/ML, , Disp: , Rfl:     risperiDONE (RisperDAL) 1 mg tablet, Take 0 5 mg by mouth 2 (two) times a day  , Disp: , Rfl:     Starch-Maltodextrin (DIAFOODS THICK-IT PO), Take by mouth, Disp: , Rfl:     terazosin (HYTRIN) 5 mg capsule, Take 5 mg by mouth daily at bedtime, Disp: , Rfl:     No Known Allergies    Objective   Vitals: Blood pressure 108/72, pulse 84, height 5' (1 524 m), weight 58 1 kg (128 lb)          Physical Exam  GEN: NAD, alert, well appearing  SKIN: dry without significant lesions or rashes  HEENT: NCAT, PERRL, EOMs intact, hearing aids noted  NECK: No JVD appreciated  CARDIOVASCULAR: RRR, normal S1, S2 without murmurs, rubs, or gallops appreciated  LUNGS: Clear to auscultation bilaterally without wheezes, rhonchi, or rales  ABDOMEN: Soft, nontender, nondistended  EXTREMITIES/VASCULAR: perfused without clubbing, cyanosis, or edema b/l  PSYCH: Normal mood and affect  NEURO: CN ll-Xll grossly intact, upper extremity tremor noted        Labs:  No visits with results within 6 Month(s) from this visit     Latest known visit with results is:   Admission on 01/18/2018, Discharged on 01/21/2018   Component Date Value    WBC 01/19/2018 7 66     RBC 01/19/2018 5 03     Hemoglobin 01/19/2018 15 6     Hematocrit 01/19/2018 44 6     MCV 01/19/2018 89     MCH 01/19/2018 31 0     MCHC 01/19/2018 35 0     RDW 01/19/2018 12 6     MPV 01/19/2018 9 6     Platelets 53/89/3443 207     nRBC 01/19/2018 0     Neutrophils Relative 01/19/2018 55     Lymphocytes Relative 01/19/2018 33     Monocytes Relative 01/19/2018 9     Eosinophils Relative 01/19/2018 3     Basophils Relative 01/19/2018 0     Neutrophils Absolute 01/19/2018 4 24     Lymphocytes Absolute 01/19/2018 2 49     Monocytes Absolute 01/19/2018 0 67     Eosinophils Absolute 01/19/2018 0 23     Basophils Absolute 01/19/2018 0 02     Magnesium 01/19/2018 2 2     Phosphorus 01/19/2018 3 4     Protime 01/19/2018 14 4     INR 01/19/2018 1 12     PTT 01/19/2018 32     Sodium 01/19/2018 141     Potassium 01/19/2018 3 8     Chloride 01/19/2018 108     CO2 01/19/2018 25     ANION GAP 01/19/2018 8     BUN 01/19/2018 22     Creatinine 01/19/2018 1 05     Glucose 01/19/2018 85     Calcium 01/19/2018 8 8     eGFR 01/19/2018 74     LYME AB IGG 01/19/2018 0 13     LYME AB IGM 01/19/2018 0 88*    ABO Grouping 01/19/2018 O     Rh Factor 01/19/2018 Positive     Antibody Screen 01/19/2018 Negative     Specimen Expiration Date 01/19/2018 [id number]     Ventricular Rate 01/19/2018 73     Atrial Rate 01/19/2018 73     SD Interval 01/19/2018 183     QRSD Interval 01/19/2018 154     QT Interval 01/19/2018 483     QTC Interval 01/19/2018 533     P Axis 01/19/2018 44     QRS Axis 01/19/2018 254     T Wave Axis 01/19/2018 67     WBC 01/20/2018 10 97*    RBC 01/20/2018 4 88     Hemoglobin 01/20/2018 15 1     Hematocrit 01/20/2018 44 0     MCV 01/20/2018 90     MCH 01/20/2018 30 9     MCHC 01/20/2018 34 3     RDW 01/20/2018 12 6     Platelets 48/07/7170 187     MPV 01/20/2018 9 4     Sodium 01/20/2018 141     Potassium 01/20/2018 3 9     Chloride 01/20/2018 110*    CO2 01/20/2018 23     ANION GAP 01/20/2018 8     BUN 01/20/2018 19     Creatinine 01/20/2018 1 08     Glucose 01/20/2018 122     Calcium 01/20/2018 8 3     eGFR 01/20/2018 71     Protime 01/20/2018 15 3*    INR 01/20/2018 1 20*    Lyme 18 kD IgG 01/19/2018 Absent     Lyme 23 kD IgG 01/19/2018 Absent     Lyme 28 kD IgG 01/19/2018 Absent     Lyme 30 kD IgG 01/19/2018 Absent     Lyme 39 kD IgG 01/19/2018 Absent     Lyme 41 kD IgG 01/19/2018 Present*    Lyme 45 kD IgG 01/19/2018 Absent     Lyme 58 kD IgG 01/19/2018 Absent     Lyme 66 kD IgG 01/19/2018 Present*    Lyme 93 kD IgG 01/19/2018 Absent     Lyme 23 kD IgM 01/19/2018 Absent     Lyme 39 kD IgM 01/19/2018 Absent     Lyme 41 kD IgM 01/19/2018 Absent     Lyme IgG WB Interp  01/19/2018 Negative     Lyme IgM WB Interp  01/19/2018 Negative     Please note 01/19/2018 Comment        Imaging: I have personally reviewed pertinent reports        ECHO:   Results for orders placed during the hospital encounter of 01/18/18   Echo complete with contrast if indicated    Narrative [address]  [facility], [address] [facility]  [phone]    Transthoracic Echocardiogram  Limited 2D, Doppler, and Color Doppler    Study date:  19-Jan-2018    Patient: Livan Durant  MR number: [id number]  Account number: [de-identified]  : 1950  Age: 78 years  Gender: Male  Status: Inpatient  Location: Bedside  Height: 62 in  Weight: 119 7 lb  BP: 120/ 74 mmHg    Indications: Complete Heart Block    Diagnoses: I51 9 - Heart disease, unspecified    Sonographer:  GETACHEW Stokes, RDCS  Primary Physician:  Ezekiel Diaz MD  Referring Physician:  Arturo Pruett MD  Group:  [facility]  Interpreting Physician:  Sophia Garibay MD    SUMMARY    LEFT VENTRICLE:  Size was normal   Systolic function was normal  Ejection fraction was estimated to be 60 %  Wall thickness was normal   Doppler parameters were consistent with abnormal left ventricular relaxation (grade 1 diastolic dysfunction)  RIGHT VENTRICLE:  The size was at the upper limits of normal   Systolic function was normal     MITRAL VALVE:  There was trace regurgitation  HISTORY: PRIOR HISTORY: Complete Heart Block, Tachycardia, Deafness, Non-verbal, Left chest wall contusion, Mental Retardation    PROCEDURE: The procedure was performed at the bedside  This was a routine study  The transthoracic approach was used  The study included limited 2D imaging, limited spectral Doppler, and color Doppler  The heart rate was 83 bpm, at the  start of the study  Image quality was adequate  LEFT VENTRICLE: Size was normal  Systolic function was normal  Ejection fraction was estimated to be 60 %  There were no regional wall motion abnormalities  Wall thickness was normal  DOPPLER: Doppler parameters were consistent with  abnormal left ventricular relaxation (grade 1 diastolic dysfunction)  RIGHT VENTRICLE: The size was at the upper limits of normal  Systolic function was normal  Wall thickness was normal     LEFT ATRIUM: Size was normal     RIGHT ATRIUM: Size was normal     MITRAL VALVE: Valve structure was normal  There was normal leaflet separation  DOPPLER: The transmitral velocity was within the normal range   There was no evidence for stenosis  There was trace regurgitation  AORTIC VALVE: The valve was trileaflet  Leaflets exhibited normal thickness and normal cuspal separation  DOPPLER: Transaortic velocity was within the normal range  There was no evidence for stenosis  There was no regurgitation  TRICUSPID VALVE: Not assessed  PULMONIC VALVE: Not well visualized  PERICARDIUM: There was no pericardial effusion  The pericardium was normal in appearance  AORTA: The root exhibited normal size  SYSTEMIC VEINS: IVC: The inferior vena cava was normal in size and course  Respirophasic changes were normal     SYSTEM MEASUREMENT TABLES    2D  LA Area: 14 04 cm2  LVEDV MOD A4C: 74 92 ml  LVEF MOD A4C: 61 32 %  LVESV MOD A4C: 28 98 ml  LVLd A4C: 7 86 cm  LVLs A4C: 6 49 cm  RA Area: 15 13 cm2  SV MOD A4C: 45 94 ml  rv diam: 4 48 cm    MM  TAPSE: 2 27 cm    PW  E': 0 1 m/s  E/E': 5 22  MV A Terence: 0 69 m/s  MV Dec [place]: 1 65 m/s2  MV DecT: 308 24 ms  MV E Terence: 0 51 m/s  MV E/A Ratio: 0 73    [employer] Accredited Echocardiography Laboratory    Prepared and electronically signed by    Felisha Chatman MD  Signed 19-Jan-2018 09:58:07         CATH/STRESS TEST: no prior studies noted    EKG:  Normal sinus rhythm with sequential ventricular pacing, heart rate ~65 beats per minute, no acute ischemia able to be appreciated

## 2018-10-18 NOTE — PROGRESS NOTES
Results for orders placed or performed in visit on 10/17/18   Cardiac EP device report    Narrative    *MY CAR[facility]- [address] W[address]LS [place]  AP 5%  100%  ALL AVAILABLE LEAD PARAMETERS WITHIN NORMAL LIMITS  1AHR NOTED, 5 MINS LONG  CAN'T EXCLUDE AT VS OV SENSING ON EGRAM  NO PROGRAMMING CHANGES MADE TO DEVICE PARAMETERS  NORMAL DEVICE FUNCTION   NC

## 2018-11-23 ENCOUNTER — OFFICE VISIT (OUTPATIENT)
Dept: URGENT CARE | Age: 68
End: 2018-11-23
Payer: COMMERCIAL

## 2018-11-23 VITALS — TEMPERATURE: 97.5 F | SYSTOLIC BLOOD PRESSURE: 104 MMHG | HEART RATE: 62 BPM | DIASTOLIC BLOOD PRESSURE: 66 MMHG

## 2018-11-23 DIAGNOSIS — H10.31 ACUTE BACTERIAL CONJUNCTIVITIS OF RIGHT EYE: Primary | ICD-10-CM

## 2018-11-23 PROCEDURE — 99213 OFFICE O/P EST LOW 20 MIN: CPT | Performed by: FAMILY MEDICINE

## 2018-11-23 RX ORDER — TOBRAMYCIN 3 MG/ML
2 SOLUTION/ DROPS OPHTHALMIC
Qty: 5 ML | Refills: 0 | Status: SHIPPED | OUTPATIENT
Start: 2018-11-23 | End: 2022-01-05

## 2018-11-23 NOTE — PATIENT INSTRUCTIONS
Antibiotic eyedrops prescription sent to pharmacy-use as directed  If symptoms appear in the left eye then start antibiotic drops in that eye also  Warm compresses several times daily  Change pillowcase daily  Follow up with PCP in 3-5 days  Proceed to  ER if symptoms worsen  Conjunctivitis   AMBULATORY CARE:   Conjunctivitis,  or pink eye, is inflammation of your conjunctiva  The conjunctiva is a thin tissue that covers the front of your eye and the back of your eyelids  The conjunctiva helps protect your eye and keep it moist  Conjunctivitis may be caused by bacteria, allergies, or a virus  If your conjunctivitis is caused by bacteria, it may get better on its own in about 7 days  Viral conjunctivitis can last up to 3 weeks  Common symptoms may include any of the following: You will usually have symptoms in both eyes if your conjunctivitis is caused by allergies  You may also have other allergic symptoms, such as a rash or runny nose  Symptoms will usually start in 1 eye if your conjunctivitis is caused by a virus or bacteria  · Redness in the whites of your eye    · Itching in your eye or around your eye    · Feeling like there is something in your eye    · Watery or thick, sticky discharge    · Crusty eyelids when you wake up in the morning    · Burning, stinging, or swelling in your eye    · Pain when you see bright light  Seek care immediately if:   · You have worsening eye pain  · The swelling in your eye gets worse, even after treatment  · Your vision suddenly becomes worse or you cannot see at all  Contact your healthcare provider if:   · You develop a fever and ear pain  · You have tiny bumps or spots of blood on your eye  · You have questions or concerns about your condition or care  Treatment  will depend on the cause of your conjunctivitis  You may need antibiotics or allergy medicine as a pill, eye drop, or eye ointment  Manage your symptoms:   · Apply a cool compress  Wet a washcloth with cold water and place it on your eye  This will help decrease itching and irritation  · Do not wear contact lenses  They can irritate your eye  Throw away the pair you are using and ask when you can wear them again  Use a new pair of lenses when your healthcare provider says it is okay  · Avoid irritants  Stay away from smoke filled areas  Shield your eyes from wind and sun  · Flush your eye  You may need to flush your eye with saline to help decrease your symptoms  Ask for more information on how to flush your eye  Medicines:  Treatment depends on what is causing your conjunctivitis  You may be given any of the following:  · Allergy medicine  helps decrease itchy, red, swollen eyes caused by allergies  It may be given as a pill, eye drops, or nasal spray  · Antibiotics  may be needed if your conjunctivitis is caused by bacteria  This medicine may be given as a pill, eye drops, or eye ointment  · Take your medicine as directed  Contact your healthcare provider if you think your medicine is not helping or if you have side effects  Tell him or her if you are allergic to any medicine  Keep a list of the medicines, vitamins, and herbs you take  Include the amounts, and when and why you take them  Bring the list or the pill bottles to follow-up visits  Carry your medicine list with you in case of an emergency  Prevent the spread of conjunctivitis:   · Wash your hands with soap and water often  Wash your hands before and after you touch your eyes  Also wash your hands before you prepare or eat food and after you use the bathroom or change a diaper  · Avoid allergens  Try to avoid the things that cause your allergies, such as pets, dust, or grass  · Avoid contact with others  Do not share towels or washcloths  Try to stay away from others as much as possible  Ask when you can return to work or school  · Throw away eye makeup    The bacteria that caused your conjunctivitis can stay in eye makeup  Throw away mascara and other eye makeup  © 2017 [address]  Information is for End User's use only and may not be sold, redistributed or otherwise used for commercial purposes  All illustrations and images included in CareNotes® are the copyrighted property of [employer] , [employer]  or Chris Mendoza  The above information is an  only  It is not intended as medical advice for individual conditions or treatments  Talk to your doctor, nurse or pharmacist before following any medical regimen to see if it is safe and effective for you

## 2018-11-23 NOTE — PROGRESS NOTES
[email or web address]        NAME: Corey Jerome is a 80 y o  male  : 1950    MRN: [id number]  DATE: 2018  TIME: 11:05 AM    Assessment and Plan   Acute bacterial conjunctivitis of right eye [H10 31]  1  Acute bacterial conjunctivitis of right eye  tobramycin (TOBREX) 0 3 % SOLN         Patient Instructions   Antibiotic eyedrops prescription sent to pharmacy-use as directed  If symptoms appear in the left eye then start antibiotic drops in that eye also  Warm compresses several times daily  Follow up with PCP in 3-5 days  Proceed to  ER if symptoms worsen  Chief Complaint     Chief Complaint   Patient presents with    Conjunctivitis     care giver stating that his right been red with discharge since yesterday         History of Present Illness   The patient is a 52-year-old male who presents with right eye redness and swelling for 1 day  The patient's caregiver is present and provides the majority of the history as the patient is mostly nonverbal   She states that yesterday she noticed some redness and swelling of the right eyelid  He also had some redness of his eye with a thick discharge  He continues to touch is eye, however, has not been complaining of pain  He does not wear contacts  No trauma to the right eye  The symptoms are not present in the left eye  Negative upper respiratory symptoms  Negative cough or congestion  HPI    Review of Systems   Review of Systems   Constitutional: Negative for chills and fever  HENT: Negative for congestion, postnasal drip, rhinorrhea, sinus pressure, sneezing and sore throat  Eyes: Positive for discharge, redness and itching  Negative for photophobia, pain and visual disturbance  Respiratory: Negative for cough and shortness of breath  All other systems reviewed and are negative          Current Medications       Current Outpatient Prescriptions:     acetaminophen (TYLENOL) 325 mg tablet, Take 325 mg by mouth every 6 (six) hours as needed for mild pain, Disp: , Rfl:     Butenafine HCl (LOTRIMIN ULTRA) 1 % cream, Apply topically, Disp: , Rfl:     Calcium Carbonate-Vitamin D 600-400 MG-UNIT per chew tablet, Chew 1 tablet 2 (two) times a day, Disp: , Rfl:     calcium polycarbophil (FIBERCON) 625 mg tablet, Take 625 mg by mouth daily, Disp: , Rfl:     cetirizine (ZyrTEC) 10 mg tablet, Take 10 mg by mouth daily, Disp: , Rfl:     citalopram (CeleXA) 20 mg tablet, Take 20 mg by mouth daily  , Disp: , Rfl:     guaiFENesin (ROBITUSSIN) 100 MG/5ML oral liquid, Take 200 mg by mouth 3 (three) times a day as needed for cough, Disp: , Rfl:     loperamide (IMODIUM A-D) 2 MG tablet, Take by mouth 4 (four) times a day as needed for diarrhea  , Disp: , Rfl:     Miconazole Nitrate 2 % AERP, Apply topically, Disp: , Rfl:     oxybutynin (DITROPAN) 5 mg tablet, Take 5 mg by mouth 2 (two) times a day, Disp: , Rfl:     PROLIA 60 MG/ML, , Disp: , Rfl:     risperiDONE (RisperDAL) 1 mg tablet, Take 0 5 mg by mouth 2 (two) times a day  , Disp: , Rfl:     Starch-Maltodextrin (DIAFOODS THICK-IT PO), Take by mouth, Disp: , Rfl:     terazosin (HYTRIN) 5 mg capsule, Take 5 mg by mouth daily at bedtime, Disp: , Rfl:     tobramycin (TOBREX) 0 3 % SOLN, Administer 2 drops to the right eye every 4 (four) hours while awake, Disp: 5 mL, Rfl: 0    Current Allergies     Allergies as of 11/23/2018    (No Known Allergies)            The following portions of the patient's history were reviewed and updated as appropriate: allergies, current medications, past family history, past medical history, past social history, past surgical history and problem list      Past Medical History:   Diagnosis Date    Developmental non-verbal disorder     Dysphagia     moderate pharyngeal dysphagia    Gait disturbance     Hearing loss     Intellectual disability     Osteoarthritis     Osteoporosis     Prostate atrophy        No past surgical history on file      No family history on file  Medications have been verified  Objective   /66 (BP Location: Right arm, Patient Position: Sitting, Cuff Size: Standard)   Pulse 62   Temp 97 5 °F (36 4 °C) (Temporal)        Physical Exam     Physical Exam   Constitutional: He appears well-developed and well-nourished  No distress  HENT:   Head: Normocephalic and atraumatic  Right Ear: External ear normal    Left Ear: External ear normal    Nose: Nose normal    Mouth/Throat: Oropharynx is clear and moist  No oropharyngeal exudate  Eyes: Pupils are equal, round, and reactive to light  EOM are normal  Right eye exhibits discharge  Right eye exhibits no chemosis, no exudate and no hordeolum  No foreign body present in the right eye  Left eye exhibits no chemosis, no discharge, no exudate and no hordeolum  No foreign body present in the left eye  Right conjunctiva is injected  Right conjunctiva has no hemorrhage  Left conjunctiva is not injected  Left conjunctiva has no hemorrhage  No scleral icterus  Right eye exhibits normal extraocular motion and no nystagmus  Left eye exhibits normal extraocular motion and no nystagmus  Right pupil is round and reactive  Left pupil is round and reactive  Pupils are equal    Neck: Normal range of motion  Neck supple  Pulmonary/Chest: Effort normal  No respiratory distress  Neurological: He is alert  Patient is wheelchair-bound  Aside from a few basic 1 word responses he is mainly nonverbal   He does follow commands  He is unable to answer oriented questions  No obvious signs of focal deficits  He moves all extremities  Skin: Skin is warm and dry  He is not diaphoretic  Psychiatric: He is noncommunicative  Mental retardation  Patient does follow some commands and says hello  Otherwise he is nonverbal for the most part  Does not appear to be in distress  Nursing note and vitals reviewed

## 2018-11-25 ENCOUNTER — OFFICE VISIT (OUTPATIENT)
Dept: URGENT CARE | Age: 68
End: 2018-11-25

## 2018-11-25 VITALS
TEMPERATURE: 97.8 F | BODY MASS INDEX: 22.97 KG/M2 | SYSTOLIC BLOOD PRESSURE: 124 MMHG | HEART RATE: 78 BPM | RESPIRATION RATE: 18 BRPM | HEIGHT: 60 IN | OXYGEN SATURATION: 96 % | WEIGHT: 117 LBS | DIASTOLIC BLOOD PRESSURE: 68 MMHG

## 2018-11-25 DIAGNOSIS — S00.81XA ABRASION OF FACE, INITIAL ENCOUNTER: Primary | ICD-10-CM

## 2018-11-25 NOTE — PROGRESS NOTES
[facility]        NAME: Bridgette Jerome is a 80 y o  male  : 1950    MRN: [id number]  DATE: 2018  TIME: 9:40 AM    Assessment and Plan   Abrasion of face, initial encounter [S00 81XA]  1  Abrasion of face, initial encounter       Offered caregiver to take patient to the emergency department for evaluation and potential CT scan as we cannot do coordination testing or any imaging here  Caregiver states he is at his baseline, happy, not complaining of any pain  Patient is not on any blood thinners  She states he did not his head but just scratched his face  She would like to conservative treatment and will watch him closely  She verbalized good understanding on when to bring him to the emergency department    Patient Instructions     Neosporin topically to abrasion on right cheek  Clean with warm water and gentle soap  Watch for signs of infection such as redness, warmth, drainage or pain  Discussed head injury at length  Sohan Barton, wants conservative treatment at this time with watching patient closely  Agrees to follow-up with the PCP for reexamination or go to the ED immediately if any signs of headache/pain, vomiting, overly tired, not responding or acting appropriately, or other concerning signs or symptoms  Chief Complaint     Chief Complaint   Patient presents with    Fall     pt fell on 3-11 shift after his shower between 8-10 pm  Pt fell out of recliner chair  below right eye area is red and right side of his face (temporal area) is noted to be bruised  History of Present Illness       Fall   Incident onset: about 10-12 hours ago  Fall occurred: He was sitting in a recliner, tried to stand up and fell from standing scraping his cheek on a dresser  Caregiver denies him hitting his head or losing consciousness  States it was a mechanical fall  Impact surface: Bumped/scraped right cheek on a dresser  There was no blood loss   The point of impact was the face  Pain location: Patient denies any pain  The pain is at a severity of 0/10  The patient is experiencing no pain  Exacerbated by: Nothing  Pertinent negatives include no abdominal pain, bowel incontinence, fever, headaches, loss of consciousness, nausea, numbness, tingling, visual change or vomiting  He has tried nothing (Patient has not been complaining so we did not give him anything for pain as he has no complaints  Caregiver had other states she brought him here based on protocol) for the symptoms  Caregiver states he is acting normally  States this is typical for him as he sometimes falls when he tries to get up on his own  He is not a blood thinners  Denies any chest pain, shortness of breath, vision changes, neck pain, recent illnesses  Patient offers no complaints at this time  Discussed at length with the caregiver that we cannot do any imaging here at this time however caregiver declines ER transfer as she states he did not really hit his head he just bumped his cheek on the dresser  States he is up-to-date on vaccines including tetanus    Review of Systems   Review of Systems   Constitutional: Negative for fever  HENT: Negative for ear pain, rhinorrhea, sore throat and trouble swallowing  Eyes: Negative for itching and visual disturbance  Respiratory: Negative for cough, shortness of breath and wheezing  Cardiovascular: Negative for chest pain and leg swelling  Gastrointestinal: Negative for abdominal pain, bowel incontinence, diarrhea, nausea and vomiting  Musculoskeletal: Negative for back pain, joint swelling, neck pain and neck stiffness  Skin: Positive for wound  Negative for rash  Neurological: Negative for dizziness, tingling, seizures, loss of consciousness, syncope, weakness, light-headedness, numbness and headaches  All other systems reviewed and are negative          Current Medications       Current Outpatient Prescriptions:     acetaminophen (TYLENOL) 325 mg tablet, Take 325 mg by mouth every 6 (six) hours as needed for mild pain, Disp: , Rfl:     Butenafine HCl (LOTRIMIN ULTRA) 1 % cream, Apply topically, Disp: , Rfl:     Calcium Carbonate-Vitamin D 600-400 MG-UNIT per chew tablet, Chew 1 tablet 2 (two) times a day, Disp: , Rfl:     calcium polycarbophil (FIBERCON) 625 mg tablet, Take 625 mg by mouth daily, Disp: , Rfl:     cetirizine (ZyrTEC) 10 mg tablet, Take 10 mg by mouth daily, Disp: , Rfl:     citalopram (CeleXA) 20 mg tablet, Take 20 mg by mouth daily  , Disp: , Rfl:     guaiFENesin (ROBITUSSIN) 100 MG/5ML oral liquid, Take 200 mg by mouth 3 (three) times a day as needed for cough, Disp: , Rfl:     loperamide (IMODIUM A-D) 2 MG tablet, Take by mouth 4 (four) times a day as needed for diarrhea  , Disp: , Rfl:     Miconazole Nitrate 2 % AERP, Apply topically, Disp: , Rfl:     oxybutynin (DITROPAN) 5 mg tablet, Take 5 mg by mouth 2 (two) times a day, Disp: , Rfl:     PROLIA 60 MG/ML, , Disp: , Rfl:     risperiDONE (RisperDAL) 1 mg tablet, Take 0 5 mg by mouth 2 (two) times a day  , Disp: , Rfl:     Starch-Maltodextrin (DIAFOODS THICK-IT PO), Take by mouth, Disp: , Rfl:     terazosin (HYTRIN) 5 mg capsule, Take 5 mg by mouth daily at bedtime, Disp: , Rfl:     tobramycin (TOBREX) 0 3 % SOLN, Administer 2 drops to the right eye every 4 (four) hours while awake, Disp: 5 mL, Rfl: 0    Current Allergies     Allergies as of 11/25/2018    (No Known Allergies)            The following portions of the patient's history were reviewed and updated as appropriate: allergies, current medications, past family history, past medical history, past social history, past surgical history and problem list      Past Medical History:   Diagnosis Date    Developmental non-verbal disorder     Dysphagia     moderate pharyngeal dysphagia    Gait disturbance     Hearing loss     Intellectual disability     Osteoarthritis     Osteoporosis     Prostate atrophy        No past surgical history on file  No family history on file  Medications have been verified  Objective   /68 (BP Location: Right arm, Patient Position: Sitting, Cuff Size: Standard)   Pulse 78   Temp 97 8 °F (36 6 °C) (Temporal)   Resp 18   Ht 5' (1 524 m)   Wt 53 1 kg (117 lb)   SpO2 96%   BMI 22 85 kg/m²        Physical Exam     Physical Exam   Constitutional: He is oriented to person, place, and time  He appears well-developed and well-nourished  No distress  Patient is smiling, at his baseline according to the caregiver   HENT:   Head: Normocephalic  Right Ear: No hemotympanum  Left Ear: No hemotympanum  Mouth/Throat: Uvula is midline and oropharynx is clear and moist    Eyes: Pupils are equal, round, and reactive to light  Conjunctivae and EOM are normal    No nystagmus   Neck: Normal range of motion  Neck supple  Cardiovascular: Normal rate, regular rhythm and normal heart sounds  Pulmonary/Chest: Effort normal and breath sounds normal  He has no wheezes  Musculoskeletal: Normal range of motion  Patient is at his baseline, is in a wheelchair but able to move all 4 extremities    Neurological: He is alert and oriented to person, place, and time  NV intact with sensation and strong peripheral pulses  5/5 strength of UE/LE bilaterally  Unable to do coordination testing due to patient's baseline    Skin: Skin is warm and dry  Psychiatric: He has a normal mood and affect  His behavior is normal    Nursing note and vitals reviewed

## 2018-11-25 NOTE — PATIENT INSTRUCTIONS
Neosporin topically to abrasion on right cheek  Clean with warm water and gentle soap  Watch for signs of infection such as redness, warmth, drainage or pain  Discussed head injury at length  Neo Ramirez, wants conservative treatment at this time with watching patient closely  Agrees to follow-up with the PCP for reexamination or go to the ED immediately if any signs of headache/pain, vomiting, overly tired, not responding or acting appropriately, or other concerning signs or symptoms

## 2018-11-28 ENCOUNTER — OFFICE VISIT (OUTPATIENT)
Dept: AUDIOLOGY | Age: 68
End: 2018-11-28
Payer: COMMERCIAL

## 2018-11-28 DIAGNOSIS — H90.3 SENSORINEURAL HEARING LOSS, BILATERAL: Primary | ICD-10-CM

## 2018-11-28 PROCEDURE — 92590 HB HEARING AID EXAM ONE EAR: CPT | Performed by: AUDIOLOGIST-HEARING AID FITTER

## 2018-11-28 PROCEDURE — 92582 CONDITIONING PLAY AUDIOMETRY: CPT | Performed by: AUDIOLOGIST-HEARING AID FITTER

## 2018-11-28 PROCEDURE — 92555 SPEECH THRESHOLD AUDIOMETRY: CPT | Performed by: AUDIOLOGIST-HEARING AID FITTER

## 2018-11-28 PROCEDURE — 92567 TYMPANOMETRY: CPT | Performed by: AUDIOLOGIST-HEARING AID FITTER

## 2018-11-28 NOTE — PROGRESS NOTES
HEARING EVALUATION    Name:  Charlotte Jerome  :  1950  Age:  80 y o  Date of Evaluation: 18     History: Known bilateral sensorineural hearing loss  Reason for visit: Modesta Cooper is being seen today at the request of Dr Shabnam Culver for an evaluation of hearing  Giovanni Vallejo was recently seen in our clinic on 10/10/18 for a hearing test and hearing aid check  However, audiological testing was deferred due to occluded cerumen in both ears  Today his aide reported that he was seen by PCP and had cerumen removed  Giovanni Vallejo currently wears an [id number] [place] Thunderbird [place] in the right ear only with an  warranty in 2018  Insurance verification was completed and Giovanni Vallejo is eligible for a new hearing aid  EVALUATION:    Otoscopic Evaluation:   Right Ear: Minimum cerumen    Left Ear: Minimum cerumen     Tympanometry:   Right: Type B - middle ear disorder   Left: Type B - middle ear disorder    Audiogram Results:  Pure tone testing using conditioned play audiometry revealed a severe to profound presumably sensorineural hearing loss in the right ear and a profound presumably sensorineural hearing loss in the left ear at the frequencies tested  SRT (using spondee cards ) and PTA are in agreement indicating good test reliability  *see attached audiogram        Hearing Aid Evaluation    Audiologic Results: Audiologic testing was performed today, testing revealed severe to profound SNHL in right ear and profound SNHL in left ear  Amplification is recommended in the right ear only    Hearing aid Evaluation:  Hearing aid styles, technology, and price were discussed with the patient  Possible hearing aid options, programs, and accessories were discussed  Pricing options and technology levels were discussed to determine the appropriate device to recommend  Recommendation/Quote: The first hearing aid recommendation is [employer] Dynamo SP4      See attached quote sheet*    Selection:   The patient selected the [facility] Dynamo SP4 hearing aids  Level: SP   Color: Black   Earmold Impressions: current mold fits    RECOMMENDATIONS:  Annual hearing eval, Return to [facility]  for F/U, Copy to Patient/Caregiver and Hearing aid Fitting/Pickup when new hearing aid arrives  I will call Craig Sun (supervisor at facility) at [phone] to schedule HAP  PATIENT EDUCATION:   Discussed results and recommendations with Sissy Bowman and his aide  Questions were addressed and the patient was encouraged to contact our department should concerns arise        Christin Walker , CCC-A  Clinical Audiologist

## 2018-11-28 NOTE — LETTER
2018     Familia Abarca MD  [address] [place] [address]    Patient: Alyson Jerome   YOB: 1950   Date of Visit: 2018       Dear Dr Chuy Andersen:    Thank you for referring zO Nicholson to me for evaluation  Below are my notes for this consultation  If you have questions, please do not hesitate to call me  I look forward to following your patient along with you  Sincerely,        Kwasi Arra        CC: No Recipients  Kwasi Arra  2018 10:49 AM  Sign at close encounter  HEARING EVALUATION    Name:  Alyson Jerome  :  1950  Age:  80 y o  Date of Evaluation: 18     History: Known bilateral sensorineural hearing loss  Reason for visit: Samantha Garza is being seen today at the request of Dr Chuy Andersen for an evaluation of hearing  Ivon Christian was recently seen in our clinic on 10/10/18 for a hearing test and hearing aid check  However, audiological testing was deferred due to occluded cerumen in both ears  Today his aide reported that he was seen by PCP and had cerumen removed  Ivon Christian currently wears an [id number] [place] Thunder[place] in the right ear only with an  warranty in 2018  Insurance verification was completed and Ivon Christian is eligible for a new hearing aid  EVALUATION:    Otoscopic Evaluation:   Right Ear: Minimum cerumen    Left Ear: Minimum cerumen     Tympanometry:   Right: Type B - middle ear disorder   Left: Type B - middle ear disorder    Audiogram Results:  Pure tone testing using conditioned play audiometry revealed a severe to profound presumably sensorineural hearing loss in the right ear and a profound presumably sensorineural hearing loss in the left ear at the frequencies tested  SRT (using spondee cards ) and PTA are in agreement indicating good test reliability  *see attached audiogram        Hearing Aid Evaluation    Audiologic Results:   Audiologic testing was performed today, testing revealed severe to profound SNHL in right ear and profound SNHL in left ear  Amplification is recommended in the right ear only    Hearing aid Evaluation:  Hearing aid styles, technology, and price were discussed with the patient  Possible hearing aid options, programs, and accessories were discussed  Pricing options and technology levels were discussed to determine the appropriate device to recommend  Recommendation/Quote: The first hearing aid recommendation is Oticon Dynamo SP4  See attached quote sheet*    Selection:   The patient selected the Oticon Dynamo SP4 hearing aids  Level: SP   Color: Black   Earmold Impressions: current mold fits    RECOMMENDATIONS:  Annual hearing eval, Return to [facility]  for F/U, Copy to Patient/Caregiver and Hearing aid Fitting/Pickup when new hearing aid arrives  I will call Darrius Crocker (supervisor at facility) at [phone] to schedule [facility]  PATIENT EDUCATION:   Discussed results and recommendations with Tony Gibson and his aide  Questions were addressed and the patient was encouraged to contact our department should concerns arise        Christin Walker , CCC-A  Clinical Audiologist

## 2018-11-29 NOTE — PROGRESS NOTES
3:12p 11/29/18    New hearing aid arrived for right ear only  Oticon Dynamo SP4 in [place]  SN #[id number]  Warranty 12/28/20  Will susi Wan to call pt to schedule HAP with me

## 2018-12-03 ENCOUNTER — OFFICE VISIT (OUTPATIENT)
Dept: AUDIOLOGY | Age: 68
End: 2018-12-03
Payer: COMMERCIAL

## 2018-12-03 DIAGNOSIS — H90.3 SENSORINEURAL HEARING LOSS, BILATERAL: Primary | ICD-10-CM

## 2018-12-03 PROCEDURE — V5257 HEARING AID, DIGIT, MON, BTE: HCPCS | Performed by: AUDIOLOGIST-HEARING AID FITTER

## 2018-12-03 PROCEDURE — V5241 DISPENSING FEE, MONAURAL: HCPCS | Performed by: AUDIOLOGIST-HEARING AID FITTER

## 2018-12-03 PROCEDURE — 92594 HB ELECTRO HEARNG AID TEST ONE: CPT | Performed by: AUDIOLOGIST-HEARING AID FITTER

## 2018-12-03 NOTE — PROGRESS NOTES
Hearing Aid Fitting    Name:  Coreen Jerome  :  1950  Age:  80 y o  Date of Evaluation: 18     Coreen Jerome is being see today to be fit with new hearing aids  Patient is being fit with [email or web address] Dynamo SP4 in the right ear  The hearing aid(s) was adjusted based on the patient's most recent audiogram and patient comfort  Patient reported good sound quality  Right   Make [facility] [place] [id number]   Color Natasha Rule   Dome/Mold Full shell (not new)   /Length NA   Battery 13   Warranty 20     Care and cleaning of the hearing aids was reviewed  Domes, filters, and batteries were reviewed with the patient  Insertion and removal of the hearing aids was done  The patient practiced insertion and removal of the devices in the office, they demonstrated good ability to manipulate the hearing aids  Telephone use was reviewed with the patient  The patient expressed satisfaction with the hearing aids  Recommendation:   Follow up in 1 month        Christin Walker , CCC-A  Clinical Audiologist

## 2018-12-04 ENCOUNTER — HOSPITAL ENCOUNTER (EMERGENCY)
Facility: HOSPITAL | Age: 68
Discharge: HOME | End: 2018-12-04
Attending: EMERGENCY MEDICINE | Admitting: EMERGENCY MEDICINE
Payer: COMMERCIAL

## 2018-12-04 VITALS
DIASTOLIC BLOOD PRESSURE: 101 MMHG | WEIGHT: 117.06 LBS | SYSTOLIC BLOOD PRESSURE: 167 MMHG | RESPIRATION RATE: 18 BRPM | BODY MASS INDEX: 22.98 KG/M2 | HEART RATE: 78 BPM | HEIGHT: 60 IN | OXYGEN SATURATION: 97 % | TEMPERATURE: 97.7 F

## 2018-12-04 DIAGNOSIS — Z00.00 ENCOUNTER FOR WELLNESS EXAMINATION IN ADULT: Primary | ICD-10-CM

## 2018-12-04 PROCEDURE — 99283 EMERGENCY DEPT VISIT LOW MDM: CPT
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
## 2018-12-05 NOTE — ED NOTES
D/C instructions and follow up reviewed by Dr Clemente Mancini with caregiver       Leonela Olivarez, COMPA  12/04/18 1940

## 2018-12-05 NOTE — DISCHARGE INSTRUCTIONS
Normal Exam   WHAT YOU NEED TO KNOW:   Your healthcare provider did not find a reason for your symptoms today  You may need to follow up with your healthcare provider or a specialist  He will work with you to try to find the cause of your symptoms  He may also run tests to find out more about your overall health  DISCHARGE INSTRUCTIONS:   Follow up with your healthcare provider or a specialist as directed:  Tell your healthcare provider about your symptoms  You may be given a complete physical exam and health checkup  Write down your questions so you remember to ask them during your visits  Maintain a healthy lifestyle:  Healthy foods and regular physical activity can improve your health  They also decrease your risk of heart disease, high blood pressure, and diabetes  · Get 30 minutes of activity every day  most days of the week  Ask your healthcare provider which activities are best for you  You can do 30 minutes at once or spread your activity throughout the day to get the recommended amount  · Eat a variety of healthy foods  Healthy foods include whole-grain breads, low-fat dairy products, beans, lean meats, and fish  Eat fruits and vegetables every day, especially those that are green, orange, and red  · Maintain a healthy weight  Ask your healthcare provider how much you should weigh  Ask him to help you create a weight loss plan if you are overweight  · Limit alcohol  Women should limit alcohol to 1 drink a day  Men should limit alcohol to 2 drinks a day  A drink of alcohol is 12 ounces of beer, 5 ounces of wine, or 1½ ounces of liquor  Do not smoke: If you smoke, it is never too late to quit  You lower your risk for many health problems if you quit  Ask your healthcare provider for information if you need help quitting  Contact your healthcare provider if:   · Your symptoms get worse, or you have new symptoms that bother you       · You have questions or concerns about your condition or care     · Your illness makes it difficult to follow a healthy diet  Return to the emergency department if:   · You have trouble breathing  · You have chest pain  · You feel lightheaded or faint  © 2017 [employer] Information is for End User's use only and may not be sold, redistributed or otherwise used for commercial purposes  All illustrations and images included in CareNotes® are the copyrighted property of [employer]  or Chris Mendoza  The above information is an  only  It is not intended as medical advice for individual conditions or treatments  Talk to your doctor, nurse or pharmacist before following any medical regimen to see if it is safe and effective for you  Wellness Visit for Adults   AMBULATORY CARE:   A wellness visit  is when you see your healthcare provider to get screened for health problems  You can also get advice on how to stay healthy  Write down your questions so you remember to ask them  Ask your healthcare provider how often you should have a wellness visit  What happens at a wellness visit:  Your healthcare provider will ask about your health, and your family history of health problems  This includes high blood pressure, heart disease, and cancer  He or she will ask if you have symptoms that concern you, if you smoke, and about your mood  You may also be asked about your intake of medicines, supplements, food, and alcohol  Any of the following may be done:  · Your weight  will be checked  Your height may also be checked so your body mass index (BMI) can be calculated  Your BMI shows if you are at a healthy weight  · Your blood pressure  and heart rate will be checked  Your temperature may also be checked  · Blood and urine tests  may be done  Blood tests may be done to check your cholesterol levels  Abnormal cholesterol levels increase your risk for heart disease and stroke   You may also need a blood or urine test to check for diabetes if you are at increased risk  Urine tests may be done to look for signs of an infection or kidney disease  · A physical exam  includes checking your heartbeat and lungs with a stethoscope  Your healthcare provider may also check your skin to look for sun damage  · Screening tests  may be recommended  A screening test is done to check for diseases that may not cause symptoms  The screening tests you may need depend on your age, gender, family history, and lifestyle habits  For example, colorectal screening may be recommended if you are 48years old or older  Screening tests you need if you are a woman:   · A Pap smear  is used to screen for cervical cancer  Pap smears are usually done every 3 to 5 years depending on your age  You may need them more often if you have had abnormal Pap smear test results in the past  Ask your healthcare provider how often you should have a Pap smear  · A mammogram  is an x-ray of your breasts to screen for breast cancer  Experts recommend mammograms every 2 years starting at age 48 years  You may need a mammogram at age 52 years or younger if you have an increased risk for breast cancer  Talk to your healthcare provider about when you should start having mammograms and how often you need them  Vaccines you may need:   · Get an influenza vaccine  every year  The influenza vaccine protects you from the flu  Several types of viruses cause the flu  The viruses change over time, so new vaccines are made each year  · Get a tetanus-diphtheria (Td) booster vaccine  every 10 years  This vaccine protects you against tetanus and diphtheria  Tetanus is a severe infection that may cause painful muscle spasms and lockjaw  Diphtheria is a severe bacterial infection that causes a thick covering in the back of your mouth and throat  · Get a human papillomavirus (HPV) vaccine  if you are female and aged 23 to 32 or male 23 to 24 and never received it   This vaccine protects you from HPV infection  HPV is the most common infection spread by sexual contact  HPV may also cause vaginal, penile, and anal cancers  · Get a pneumococcal vaccine  if you are aged 72 years or older  The pneumococcal vaccine is an injection given to protect you from pneumococcal disease  Pneumococcal disease is an infection caused by pneumococcal bacteria  The infection may cause pneumonia, meningitis, or an ear infection  · Get a shingles vaccine  if you are aged 61 or older, even if you have had shingles before  The shingles vaccine is an injection to protect you from the varicella-zoster virus  This is the same virus that causes chickenpox  Shingles is a painful rash that develops in people who had chickenpox or have been exposed to the virus  How to eat healthy:  My Plate is a model for planning healthy meals  It shows the types and amounts of foods that should go on your plate  Fruits and vegetables make up about half of your plate, and grains and protein make up the other half  A serving of dairy is included on the side of your plate  The amount of calories and serving sizes you need depends on your age, gender, weight, and height  Examples of healthy foods are listed below:  · Eat a variety of vegetables  such as dark green, red, and orange vegetables  You can also include canned vegetables low in sodium (salt) and frozen vegetables without added butter or sauces  · Eat a variety of fresh fruits , canned fruit in 100% juice, frozen fruit, and dried fruit  · Include whole grains  At least half of the grains you eat should be whole grains  Examples include whole-wheat bread, wheat pasta, brown rice, and whole-grain cereals such as oatmeal     · Eat a variety of protein foods such as seafood (fish and shellfish), lean meat, and poultry without skin (turkey and chicken)   Examples of lean meats include pork leg, shoulder, or tenderloin, and beef round, sirloin, tenderloin, and extra lean ground beef  Other protein foods include eggs and egg substitutes, beans, peas, soy products, nuts, and seeds  · Choose low-fat dairy products such as skim or 1% milk or low-fat yogurt, cheese, and cottage cheese  · Limit unhealthy fats  such as butter, hard margarine, and shortening  Exercise:  Exercise at least 30 minutes per day on most days of the week  Some examples of exercise include walking, biking, dancing, and swimming  You can also fit in more physical activity by taking the stairs instead of the elevator or parking farther away from stores  Include muscle strengthening activities 2 days each week  Regular exercise provides many health benefits  It helps you manage your weight, and decreases your risk for type 2 diabetes, heart disease, stroke, and high blood pressure  Exercise can also help improve your mood  Ask your healthcare provider about the best exercise plan for you  General health and safety guidelines:   · Do not smoke  Nicotine and other chemicals in cigarettes and cigars can cause lung damage  Ask your healthcare provider for information if you currently smoke and need help to quit  E-cigarettes or smokeless tobacco still contain nicotine  Talk to your healthcare provider before you use these products  · Limit alcohol  A drink of alcohol is 12 ounces of beer, 5 ounces of wine, or 1½ ounces of liquor  · Lose weight, if needed  Being overweight increases your risk of certain health conditions  These include heart disease, high blood pressure, type 2 diabetes, and certain types of cancer  · Protect your skin  Do not sunbathe or use tanning beds  Use sunscreen with a SPF 15 or higher  Apply sunscreen at least 15 minutes before you go outside  Reapply sunscreen every 2 hours  Wear protective clothing, hats, and sunglasses when you are outside  · Drive safely  Always wear your seatbelt  Make sure everyone in your car wears a seatbelt   A seatbelt can save your life if you are in an accident  Do not use your cell phone when you are driving  This could distract you and cause an accident  Pull over if you need to make a call or send a text message  · Practice safe sex  Use latex condoms if are sexually active and have more than one partner  Your healthcare provider may recommend screening tests for sexually transmitted infections (STIs)  · Wear helmets, lifejackets, and protective gear  Always wear a helmet when you ride a bike or motorcycle, go skiing, or play sports that could cause a head injury  Wear protective equipment when you play sports  Wear a lifejacket when you are on a boat or doing water sports  © 2017 [facility] Information is for End User's use only and may not be sold, redistributed or otherwise used for commercial purposes  All illustrations and images included in CareNotes® are the copyrighted property of [employer]  or Chris Mendoza  The above information is an  only  It is not intended as medical advice for individual conditions or treatments  Talk to your doctor, nurse or pharmacist before following any medical regimen to see if it is safe and effective for you  Wellness Visit for Adults   WHAT YOU NEED TO KNOW:   What is a wellness visit? A wellness visit is when you see your healthcare provider to get screened for health problems  You can also get advice on how to stay healthy  Write down your questions so you remember to ask them  Ask your healthcare provider how often you should have a wellness visit  What happens at a wellness visit? Your healthcare provider will ask about your health, and your family history of health problems  This includes high blood pressure, heart disease, and cancer  He or she will ask if you have symptoms that concern you, if you smoke, and about your mood  You may also be asked about your intake of medicines, supplements, food, and alcohol   Any of the following may be done:  · Your weight  will be checked  Your height may also be checked so your body mass index (BMI) can be calculated  Your BMI shows if you are at a healthy weight  · Your blood pressure  and heart rate will be checked  Your temperature may also be checked  · Blood and urine tests  may be done  Blood tests may be done to check your cholesterol levels  Abnormal cholesterol levels increase your risk for heart disease and stroke  You may also need a blood or urine test to check for diabetes if you are at increased risk  Urine tests may be done to look for signs of an infection or kidney disease  · A physical exam  includes checking your heartbeat and lungs with a stethoscope  Your healthcare provider may also check your skin to look for sun damage  · Screening tests  may be recommended  A screening test is done to check for diseases that may not cause symptoms  The screening tests you may need depend on your age, gender, family history, and lifestyle habits  For example, colorectal screening may be recommended if you are 48years old or older  What screening tests do I need if I am a woman? · A Pap smear  is used to screen for cervical cancer  Pap smears are usually done every 3 to 5 years depending on your age  You may need them more often if you have had abnormal Pap smear test results in the past  Ask your healthcare provider how often you should have a Pap smear  · A mammogram  is an x-ray of your breasts to screen for breast cancer  Experts recommend mammograms every 2 years starting at age 48 years  You may need a mammogram at age 52 years or younger if you have an increased risk for breast cancer  Talk to your healthcare provider about when you should start having mammograms and how often you need them  What vaccines might I need? · Get an influenza vaccine  every year  The influenza vaccine protects you from the flu  Several types of viruses cause the flu   The viruses change over time, so new vaccines are made each year  · Get a tetanus-diphtheria (Td) booster vaccine  every 10 years  This vaccine protects you against tetanus and diphtheria  Tetanus is a severe infection that may cause painful muscle spasms and lockjaw  Diphtheria is a severe bacterial infection that causes a thick covering in the back of your mouth and throat  · Get a human papillomavirus (HPV) vaccine  if you are female and aged 23 to 32 or male 23 to 24 and never received it  This vaccine protects you from HPV infection  HPV is the most common infection spread by sexual contact  HPV may also cause vaginal, penile, and anal cancers  · Get a pneumococcal vaccine  if you are aged 72 years or older  The pneumococcal vaccine is an injection given to protect you from pneumococcal disease  Pneumococcal disease is an infection caused by pneumococcal bacteria  The infection may cause pneumonia, meningitis, or an ear infection  · Get a shingles vaccine  if you are aged 61 or older, even if you have had shingles before  The shingles vaccine is an injection to protect you from the varicella-zoster virus  This is the same virus that causes chickenpox  Shingles is a painful rash that develops in people who had chickenpox or have been exposed to the virus  How can I eat healthy? My Plate is a model for planning healthy meals  It shows the types and amounts of foods that should go on your plate  Fruits and vegetables make up about half of your plate, and grains and protein make up the other half  A serving of dairy is included on the side of your plate  The amount of calories and serving sizes you need depends on your age, gender, weight, and height  Examples of healthy foods are listed below:  · Eat a variety of vegetables  such as dark green, red, and orange vegetables  You can also include canned vegetables low in sodium (salt) and frozen vegetables without added butter or sauces      · Eat a variety of fresh fruits , canned fruit in 100% juice, frozen fruit, and dried fruit  · Include whole grains  At least half of the grains you eat should be whole grains  Examples include whole-wheat bread, wheat pasta, brown rice, and whole-grain cereals such as oatmeal     · Eat a variety of protein foods such as seafood (fish and shellfish), lean meat, and poultry without skin (turkey and chicken)  Examples of lean meats include pork leg, shoulder, or tenderloin, and beef round, sirloin, tenderloin, and extra lean ground beef  Other protein foods include eggs and egg substitutes, beans, peas, soy products, nuts, and seeds  · Choose low-fat dairy products such as skim or 1% milk or low-fat yogurt, cheese, and cottage cheese  · Limit unhealthy fats  such as butter, hard margarine, and shortening  How much exercise do I need? Exercise at least 30 minutes per day on most days of the week  Some examples of exercise include walking, biking, dancing, and swimming  You can also fit in more physical activity by taking the stairs instead of the elevator or parking farther away from stores  Include muscle strengthening activities 2 days each week  Regular exercise provides many health benefits  It helps you manage your weight, and decreases your risk for type 2 diabetes, heart disease, stroke, and high blood pressure  Exercise can also help improve your mood  Ask your healthcare provider about the best exercise plan for you  What are some general health and safety guidelines I should follow? · Do not smoke  Nicotine and other chemicals in cigarettes and cigars can cause lung damage  Ask your healthcare provider for information if you currently smoke and need help to quit  E-cigarettes or smokeless tobacco still contain nicotine  Talk to your healthcare provider before you use these products  · Limit alcohol  A drink of alcohol is 12 ounces of beer, 5 ounces of wine, or 1½ ounces of liquor      · Lose weight, if needed  Being overweight increases your risk of certain health conditions  These include heart disease, high blood pressure, type 2 diabetes, and certain types of cancer  · Protect your skin  Do not sunbathe or use tanning beds  Use sunscreen with a SPF 15 or higher  Apply sunscreen at least 15 minutes before you go outside  Reapply sunscreen every 2 hours  Wear protective clothing, hats, and sunglasses when you are outside  · Drive safely  Always wear your seatbelt  Make sure everyone in your car wears a seatbelt  A seatbelt can save your life if you are in an accident  Do not use your cell phone when you are driving  This could distract you and cause an accident  Pull over if you need to make a call or send a text message  · Practice safe sex  Use latex condoms if are sexually active and have more than one partner  Your healthcare provider may recommend screening tests for sexually transmitted infections (STIs)  · Wear helmets, lifejackets, and protective gear  Always wear a helmet when you ride a bike or motorcycle, go skiing, or play sports that could cause a head injury  Wear protective equipment when you play sports  Wear a lifejacket when you are on a boat or doing water sports  CARE AGREEMENT:   You have the right to help plan your care  Learn about your health condition and how it may be treated  Discuss treatment options with your caregivers to decide what care you want to receive  You always have the right to refuse treatment  The above information is an  only  It is not intended as medical advice for individual conditions or treatments  Talk to your doctor, nurse or pharmacist before following any medical regimen to see if it is safe and effective for you  © 2017 [address] [employer] Information is for End User's use only and may not be sold, redistributed or otherwise used for commercial purposes   All illustrations and images included in CareNotes® are the copyrighted property of Manuel BARAJAS  or Chris Mendoza

## 2019-01-04 ENCOUNTER — APPOINTMENT (OUTPATIENT)
Dept: RADIOLOGY | Age: 69
End: 2019-01-04
Payer: COMMERCIAL

## 2019-01-04 ENCOUNTER — TRANSCRIBE ORDERS (OUTPATIENT)
Dept: ADMINISTRATIVE | Age: 69
End: 2019-01-04

## 2019-01-04 DIAGNOSIS — M17.9 OSTEOARTHRITIS OF KNEE, UNSPECIFIED (CODE): ICD-10-CM

## 2019-01-04 DIAGNOSIS — M17.9 OSTEOARTHRITIS OF KNEE, UNSPECIFIED (CODE): Primary | ICD-10-CM

## 2019-01-04 PROCEDURE — 73562 X-RAY EXAM OF KNEE 3: CPT

## 2019-01-09 ENCOUNTER — OFFICE VISIT (OUTPATIENT)
Dept: AUDIOLOGY | Age: 69
End: 2019-01-09

## 2019-01-09 DIAGNOSIS — H90.3 SENSORINEURAL HEARING LOSS, BILATERAL: Primary | ICD-10-CM

## 2019-01-09 NOTE — PROGRESS NOTES
Hearing Aid Visit:    Name:  Ad Jerome  :  1950  Age:  80 y o  Date of Evaluation: 19     Ad Jerome is being seen for a hearing aid visit  Ad Jerome   is fit in the right ear only with Ot[employer]oc[employer]o SP4 hearing aid(s) serial number [id number] right  Warranty 20  Previous ear mold still had appropriate fit at time of HAP in December  A new mold had been included in his hearing aid contract, but making the mold had been deferred until current mold no longer fit  Today his aid reported problems with his ear mold, thus a new mold was made without incident  Action:  Tubing was clogged and replaced  Made new impression without incident  Aide was informed that it will take about 2-3 weeks for new mold to be ready  Recommendations:   I will call pt to schedule [facility] when mold RFF  Check billing at [facility]         Christin Walker , CCC-A  Clinical Audiologist

## 2019-01-25 ENCOUNTER — REMOTE DEVICE CLINIC VISIT (OUTPATIENT)
Dept: CARDIOLOGY CLINIC | Facility: CLINIC | Age: 69
End: 2019-01-25
Payer: COMMERCIAL

## 2019-01-25 DIAGNOSIS — I44.2 HEART BLOCK AV THIRD DEGREE (HCC): Primary | ICD-10-CM

## 2019-01-25 DIAGNOSIS — Z95.0 CARDIAC PACEMAKER IN SITU: ICD-10-CM

## 2019-01-25 PROCEDURE — 93296 REM INTERROG EVL PM/IDS: CPT | Performed by: INTERNAL MEDICINE

## 2019-01-25 PROCEDURE — 93294 REM INTERROG EVL PM/LDLS PM: CPT | Performed by: INTERNAL MEDICINE

## 2019-01-28 NOTE — PROGRESS NOTES
Results for orders placed or performed in visit on 01/25/19   Cardiac EP device report    Narrative    CARELINK TRANSMISSION: BATTERY STATUS "OK"  AP 6%  100%  ALL AVAILABLE LEAD PARAMETERS WITHIN NORMAL LIMITS  NO SIGNIFICANT HIGH RATE EPISODES  NORMAL DEVICE FUNCTION   NC

## 2019-02-01 ENCOUNTER — OFFICE VISIT (OUTPATIENT)
Dept: AUDIOLOGY | Age: 69
End: 2019-02-01

## 2019-02-01 DIAGNOSIS — H90.3 SENSORINEURAL HEARING LOSS, BILATERAL: Primary | ICD-10-CM

## 2019-02-01 NOTE — PROGRESS NOTES
Progress Note    Name:  Lucinda Jerome  :  1950  Age:  80 y o  Date of Evaluation: 19     Tejas Arriola came in today to  his new ear mold  Cut tubing to appropriate length and mold fit well  William's aide reported that he dropped his hearing aid in the toilet one time and is concerned that it may not work right anymore  Ran ANSI curve revealing appropriate gain  This ear mold is part of the [facility] billing  Recommendations: Follow up as needed        Christin Walker , CCC-A  Clinical Audiologist

## 2019-02-18 RX ORDER — MELOXICAM 7.5 MG/1
7.5 TABLET ORAL DAILY
COMMUNITY
Start: 2019-01-03

## 2019-02-20 ENCOUNTER — OFFICE VISIT (OUTPATIENT)
Dept: CARDIOLOGY CLINIC | Facility: CLINIC | Age: 69
End: 2019-02-20

## 2019-02-20 VITALS
HEIGHT: 60 IN | DIASTOLIC BLOOD PRESSURE: 76 MMHG | SYSTOLIC BLOOD PRESSURE: 122 MMHG | BODY MASS INDEX: 22.86 KG/M2 | HEART RATE: 62 BPM

## 2019-02-20 DIAGNOSIS — I44.2 THIRD DEGREE HEART BLOCK (HCC): Primary | ICD-10-CM

## 2019-02-20 PROCEDURE — 99024 POSTOP FOLLOW-UP VISIT: CPT | Performed by: PHYSICIAN ASSISTANT

## 2019-02-20 NOTE — PROGRESS NOTES
I last saw this patient 10/2018 for preop clearance for gallbladder surgery  At that time, I had recommended PRN follow-up  He presents today for a previously scheduled yearly follow-up visit  He offers no complaints today  His pacemaker was recently checked which showed appropriate function  With no active issues, I have chosen to not charge him for today's visit  I instructed his caregiver that he can follow-up on an as needed basis, but to contact us with any questions, concerns, or changes in symptoms  He should continue with routine device interrogations as previously scheduled

## 2019-02-22 ENCOUNTER — OFFICE VISIT (OUTPATIENT)
Dept: AUDIOLOGY | Age: 69
End: 2019-02-22

## 2019-02-22 DIAGNOSIS — H90.3 SENSORINEURAL HEARING LOSS, BILATERAL: Primary | ICD-10-CM

## 2019-02-22 NOTE — PROGRESS NOTES
Hearing Aid Visit:    Name:  Gibran Jerome  :  1950  Age:  80 y o  Date of Evaluation: 19     Gibran Jerome is being seen for a hearing aid visit  Gibran Jerome   is fit in the right ear only with Ot[employer] Dynamo SP4 hearing aid(s) serial number [id number] right  Warranty 20  Today William's aide reported that the tubing detached from the ear mold  Action:  Replaced tubing  Listening check revealed appropriate sound quality  Recommendations: Follow up as needed         Christin Walker , CCC-A  Clinical Audiologist

## 2019-03-24 ENCOUNTER — HOSPITAL ENCOUNTER (EMERGENCY)
Facility: HOSPITAL | Age: 69
Discharge: HOME | End: 2019-03-24
Attending: EMERGENCY MEDICINE | Admitting: EMERGENCY MEDICINE
Payer: COMMERCIAL

## 2019-03-24 VITALS
HEART RATE: 80 BPM | RESPIRATION RATE: 18 BRPM | DIASTOLIC BLOOD PRESSURE: 64 MMHG | TEMPERATURE: 97.8 F | OXYGEN SATURATION: 98 % | SYSTOLIC BLOOD PRESSURE: 115 MMHG

## 2019-03-24 DIAGNOSIS — S01.21XA LACERATION OF NOSE, INITIAL ENCOUNTER: ICD-10-CM
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
## 2019-03-24 LAB
BILIRUB UR QL STRIP: NEGATIVE
CLARITY UR: CLEAR
COLOR UR: YELLOW
GLUCOSE UR STRIP-MCNC: NEGATIVE MG/DL
HGB UR QL STRIP.AUTO: NEGATIVE
KETONES UR STRIP-MCNC: NEGATIVE MG/DL
LEUKOCYTE ESTERASE UR QL STRIP: NEGATIVE
NITRITE UR QL STRIP: NEGATIVE
PH UR STRIP.AUTO: 7 [PH] (ref 4.5–8)
PROT UR STRIP-MCNC: NEGATIVE MG/DL
SP GR UR STRIP.AUTO: 1.02 (ref 1–1.03)
UROBILINOGEN UR QL STRIP.AUTO: 0.2 E.U./DL

## 2019-03-24 PROCEDURE — 99283 EMERGENCY DEPT VISIT LOW MDM: CPT

## 2019-03-24 PROCEDURE — 81003 URINALYSIS AUTO W/O SCOPE: CPT

## 2019-03-24 NOTE — ED NOTES
Pt back in stretcher, side rails up  No complaints acting appropriately        Avinash Cheng, RN  03/24/19 [id number]

## 2019-03-24 NOTE — ED PROVIDER NOTES
History  Chief Complaint   Patient presents with   Delores Clunes Fall     pt resides in group home, staff attempting to dress pt this am, pt took "nose dive to ground" pt has lac on bridge of nose  Per staff with pt, pt acting apporopirately  HPI     43-year-old male with history of MR, third-degree heart block with pacemaker in place, who presents status post fall at his group home  Patient is accompanied by 1 of the staff members from his group home who provides the history  She tells me that another staff member was helping the patient get dressed this morning when he lost balance and fell forward landing on his face on the ground  He was able to get up immediately  Did not lose consciousness  This was a mechanical fall as he was trying to put his pants on  She states that he is unsteady at baseline, this is chronic and not new today  He has not appeared grossly ataxic  He has been at his mental baseline since the incident  No vomiting  He has not been complaining of pain  He does have a small superficial laceration over the bridge of his nose and some mild swelling over the bridge of his nose  No other changes in behavior  He is not on blood thinners or anti-platelet agents  Prior to Admission Medications   Prescriptions Last Dose Informant Patient Reported? Taking?    Butenafine HCl (LOTRIMIN ULTRA) 1 % cream  Outside Facility (Specify) Yes No   Sig: Apply topically   Calcium Carbonate-Vitamin D 600-400 MG-UNIT per chew tablet  Outside Facility (Specify) Yes No   Sig: Chew 1 tablet 2 (two) times a day   Miconazole Nitrate 2 % AERP  Outside Facility (Specify) Yes No   Sig: Apply topically   PROLIA 60 MG/ML  Outside Facility (Specify) Yes No   Starch-Maltodextrin (DIAFOODS THICK-IT PO)  Outside Facility (Specify) Yes No   Sig: Take by mouth   acetaminophen (TYLENOL) 325 mg tablet  Outside Facility (Specify) Yes No   Sig: Take 325 mg by mouth every 6 (six) hours as needed for mild pain   calcium polycarbophil (FIBERCON) 625 mg tablet  Outside Facility (Specify) Yes No   Sig: Take 625 mg by mouth daily   cetirizine (ZyrTEC) 10 mg tablet  Outside Facility (Specify) Yes No   Sig: Take 10 mg by mouth daily   citalopram (CeleXA) 20 mg tablet  Outside Facility (Specify) Yes No   Sig: Take 20 mg by mouth daily     guaiFENesin (ROBITUSSIN) 100 MG/5ML oral liquid  Outside Facility (Specify) Yes No   Sig: Take 200 mg by mouth 3 (three) times a day as needed for cough   loperamide (IMODIUM A-D) 2 MG tablet  Outside Facility (Specify) Yes No   Sig: Take by mouth 4 (four) times a day as needed for diarrhea     meloxicam (MOBIC) 7 5 mg tablet   Yes No   Sig: Take 7 5 mg by mouth daily    oxybutynin (DITROPAN) 5 mg tablet  Outside Facility (Specify) Yes No   Sig: Take 5 mg by mouth 2 (two) times a day   risperiDONE (RisperDAL) 1 mg tablet  Outside Facility (Specify) Yes No   Sig: Take 0 5 mg by mouth 2 (two) times a day     terazosin (HYTRIN) 5 mg capsule  Outside Facility (Specify) Yes No   Sig: Take 5 mg by mouth daily at bedtime   tobramycin (TOBREX) 0 3 % SOLN   No No   Sig: Administer 2 drops to the right eye every 4 (four) hours while awake      Facility-Administered Medications: None       Past Medical History:   Diagnosis Date    Developmental non-verbal disorder     Dysphagia     moderate pharyngeal dysphagia    Gait disturbance     Hearing loss     Intellectual disability     Osteoarthritis     Osteoporosis     Prostate atrophy        History reviewed  No pertinent surgical history  History reviewed  No pertinent family history  I have reviewed and agree with the history as documented      Social History     Tobacco Use    Smoking status: Never Smoker    Smokeless tobacco: Never Used   Substance Use Topics    Alcohol use: No    Drug use: No        Review of Systems   Unable to perform ROS: Other   MR, patient minimally verbal    Physical Exam  Physical Exam   Constitutional: He appears well-developed and well-nourished  No distress  HENT:   Head: Normocephalic and atraumatic  Right Ear: External ear normal    Left Ear: External ear normal    Nose: Nose normal    Mouth/Throat: Oropharynx is clear and moist    No hemotympanum bilaterally  Small asymmetry noted to the nose, with nasal bridge deviated slightly to the right, caretakers are unsure whether this is the patient's baseline  Slight swelling noted over the nasal bridge, no palpable bony deformities and patient does not seem to mind when I palpate the area  1 cm horizontal superficial well-approximated laceration over the bridge of the nose  No nasal septal hematomas  No dried blood within the nares  Eyes: Pupils are equal, round, and reactive to light  Conjunctivae and EOM are normal    Neck: Normal range of motion  Neck supple  Cardiovascular: Normal rate, regular rhythm, normal heart sounds and intact distal pulses  Exam reveals no gallop and no friction rub  No murmur heard  Pulmonary/Chest: Effort normal and breath sounds normal  No respiratory distress  He has no wheezes  He has no rales  Abdominal: Soft  Bowel sounds are normal  He exhibits no distension  There is no tenderness  There is no guarding  Musculoskeletal: Normal range of motion  He exhibits no edema or deformity  Extremities atraumatic  Full range of motion of the extremities  No deformities noted to the extremities  Neurological: He is alert  He exhibits normal muscle tone  Behavior at baseline  Follows basic commands  Tells me his name and that it is his birthday  Speech at baseline  Gait at baseline  Skin: Skin is warm and dry  He is not diaphoretic         Vital Signs  ED Triage Vitals   Temperature Pulse Respirations Blood Pressure SpO2   03/24/19 0917 03/24/19 0910 03/24/19 0910 03/24/19 0910 03/24/19 0909   97 8 °F (36 6 °C) 80 18 115/64 96 %      Temp Source Heart Rate Source Patient Position - Orthostatic VS BP Location FiO2 (%) 03/24/19 8405 03/24/19 0910 03/24/19 0910 03/24/19 0910 --   Axillary Monitor Lying Right arm       Pain Score       03/24/19 0910       No Pain           Vitals:    03/24/19 0910   BP: 115/64   Pulse: 80   Patient Position - Orthostatic VS: Lying         Visual Acuity      ED Medications  Medications - No data to display    Diagnostic Studies  Results Reviewed     Procedure Component Value Units Date/Time    ED Urine Macroscopic [[id number]] Collected:  03/24/19 1219    Lab Status:  Final result Specimen:  Urine Updated:  03/24/19 1218     Color, UA Yellow     Clarity, UA Clear     pH, UA 7 0     Leukocytes, UA Negative     Nitrite, UA Negative     Protein, UA Negative mg/dl      Glucose, UA Negative mg/dl      Ketones, UA Negative mg/dl      Urobilinogen, UA 0 2 E U /dl      Bilirubin, UA Negative     Blood, UA Negative     Specific Gravity, UA 1 020    Narrative:       CLINITEK RESULT                 No orders to display              Procedures  Lac Repair  Date/Time: 3/24/2019 9:13 PM  Performed by: Donna Romero MD  Authorized by: Donna Romero MD   Consent: Verbal consent obtained  Risks and benefits: risks, benefits and alternatives were discussed  Consent given by: guardian  Body area: head/neck  Location details: nose  Laceration length: 1 cm      Procedure Details:  Irrigation solution: saline  Irrigation method: syringe  Debridement: none  Degree of undermining: none  Skin closure: Steri-Strips  Approximation difficulty: simple  Patient tolerance: Patient tolerated the procedure well with no immediate complications             Phone Contacts  ED Phone Contact    ED Course                               MDM  Number of Diagnoses or Management Options  Fall, initial encounter:   Laceration of nose, initial encounter:   Diagnosis management comments: Patient is nontoxic appearing  Afebrile and hemodynamically stable    He has a small superficial laceration over the bridge of his nose that is hemostatic, this was closed with Steri-Strips  His nasal bridge is slightly asymmetric and there is mild swelling, I do not palpate any bony deformities and there is no blood within the nares and no nasal septal hematomas  It is possible that he has a broken nose  This was discussed with his caregiver as well as with his sister who arrived at bedside  There are no other bony abnormalities to suggest additional facial fractures  Recommend follow up with ENT in 2 weeks if asymmetry of the nose persists and causes him any difficulty breathing from his nares  At this point, ENT may consider imaging or surgical intervention, but would not recommend this on an emergent basis as the patient appears comfortable and is not having any difficulty breathing  He did hit his head when he fell but did not lose consciousness  This was a witnessed mechanical fall  He is not on blood thinners or anti-platelet agents  His neurologic exam is normal   As obtaining a CT scan would require sedation in this patient, the risks of this were thought to outweigh the benefits  Instead, the patient was observed in the emergency department for 5 hours following his injury  He remained at baseline, with no change in mental status or neurologic exam, make ICH very unlikely  Caregiver and the pt's sister who is POA agree with foregoing CT head at this time No other traumatic injuries on exam   Return precautions discussed and patient discharged in good condition  Amount and/or Complexity of Data Reviewed  Obtain history from someone other than the patient: yes    Patient Progress  Patient progress: stable           Disposition  Final diagnoses:   Fall, initial encounter   Laceration of nose, initial encounter     Time reflects when diagnosis was documented in both MDM as applicable and the Disposition within this note     Time User Action Codes Description Comment    3/24/2019 12:20 PM Beto Puri Add [employer]  XXXA] Fall, initial encounter     3/24/2019 12:21 PM Sirena Spann Add [[id number] [id number]] Laceration of nose, initial encounter       ED Disposition     ED Disposition Condition Date/Time Comment    Discharge Stable Sun Mar 24, 2019 12:20 PM Aimee Jerome discharge to home/self care  Follow-up Information     Follow up With Specialties Details Why Contact Info Additional Information    Roxie Lawler MD Otolaryngology In 2 weeks If Giuliana Khan is having any difficulty breathing out of his nose  [address] Emergency [facility] Emergency Medicine  Return to the Emergency Department immediately for any change in behavior, vomiting, or headaches   [address] [facility] [address] [phone] [facility], [address], [place], [place], [address]          Discharge Medication List as of 3/24/2019 12:22 PM      CONTINUE these medications which have NOT CHANGED    Details   acetaminophen (TYLENOL) 325 mg tablet Take 325 mg by mouth every 6 (six) hours as needed for mild pain, Historical Med      Butenafine HCl (LOTRIMIN ULTRA) 1 % cream Apply topically, Historical Med      Calcium Carbonate-Vitamin D 600-400 MG-UNIT per chew tablet Chew 1 tablet 2 (two) times a day, Historical Med      calcium polycarbophil (FIBERCON) 625 mg tablet Take 625 mg by mouth daily, Historical Med      cetirizine (ZyrTEC) 10 mg tablet Take 10 mg by mouth daily, Historical Med      citalopram (CeleXA) 20 mg tablet Take 20 mg by mouth daily  , Historical Med      guaiFENesin (ROBITUSSIN) 100 MG/5ML oral liquid Take 200 mg by mouth 3 (three) times a day as needed for cough, Historical Med      loperamide (IMODIUM A-D) 2 MG tablet Take by mouth 4 (four) times a day as needed for diarrhea  , Historical Med      meloxicam (MOBIC) 7 5 mg tablet Take 7 5 mg by mouth daily , Starting Thu 1/3/2019, Historical Med      Miconazole Nitrate 2 % AERP Apply topically, Historical Med      oxybutynin (DITROPAN) 5 mg tablet Take 5 mg by mouth 2 (two) times a day, Historical Med      PROLIA 60 MG/ML Starting Tue 7/24/2018, Historical Med      risperiDONE (RisperDAL) 1 mg tablet Take 0 5 mg by mouth 2 (two) times a day  , Historical Med      Starch-Maltodextrin (DIAFOODS THICK-IT PO) Take by mouth, Historical Med      terazosin (HYTRIN) 5 mg capsule Take 5 mg by mouth daily at bedtime, Historical Med      tobramycin (TOBREX) 0 3 % SOLN Administer 2 drops to the right eye every 4 (four) hours while awake, Starting Fri 11/23/2018, Normal           No discharge procedures on file      ED Provider  Electronically Signed by           Miguel Araujo MD  03/24/19 4001

## 2019-03-25 ENCOUNTER — TRANSCRIBE ORDERS (OUTPATIENT)
Dept: ADMINISTRATIVE | Age: 69
End: 2019-03-25

## 2019-03-25 ENCOUNTER — APPOINTMENT (OUTPATIENT)
Dept: RADIOLOGY | Age: 69
End: 2019-03-25
Payer: COMMERCIAL

## 2019-03-25 DIAGNOSIS — S09.92XD NASAL INJURY, SUBSEQUENT ENCOUNTER: Primary | ICD-10-CM

## 2019-03-25 DIAGNOSIS — S09.92XD NASAL INJURY, SUBSEQUENT ENCOUNTER: ICD-10-CM

## 2019-03-25 PROCEDURE — 70160 X-RAY EXAM OF NASAL BONES: CPT

## 2019-03-29 NOTE — PROGRESS NOTES
8:21a 3/29/19    Spoke with William's aide, Denice Harmon, on the phone  She stated that he still has his ear mold, but lost his hearing aid  I submitted a loss and damage claim  Will schedule HAV when new hearing aid RFFs

## 2019-04-01 NOTE — PROGRESS NOTES
4/1/19 [employer] invoice# [id number]  Replacement Dynamo SP4   New Serial # [id number]  Liz Henson to schedule HAV    Christin Arroyo D

## 2019-04-09 ENCOUNTER — OFFICE VISIT (OUTPATIENT)
Dept: AUDIOLOGY | Age: 69
End: 2019-04-09
Payer: COMMERCIAL

## 2019-04-09 DIAGNOSIS — H90.3 SENSORY HEARING LOSS, BILATERAL: Primary | ICD-10-CM

## 2019-04-25 ENCOUNTER — IN-CLINIC DEVICE VISIT (OUTPATIENT)
Dept: CARDIOLOGY CLINIC | Facility: CLINIC | Age: 69
End: 2019-04-25
Payer: COMMERCIAL

## 2019-04-25 DIAGNOSIS — Z45.018 ADJUSTMENT AND MANAGEMENT OF CARDIAC PACEMAKER: ICD-10-CM

## 2019-04-25 DIAGNOSIS — I44.2 AV BLOCK, COMPLETE (HCC): Primary | ICD-10-CM

## 2019-04-25 DIAGNOSIS — Z95.0 CARDIAC PACEMAKER IN SITU: ICD-10-CM

## 2019-04-25 PROCEDURE — 93280 PM DEVICE PROGR EVAL DUAL: CPT | Performed by: INTERNAL MEDICINE

## 2019-05-24 ENCOUNTER — OFFICE VISIT (OUTPATIENT)
Dept: AUDIOLOGY | Age: 69
End: 2019-05-24

## 2019-05-24 DIAGNOSIS — H90.3 SENSORY HEARING LOSS, BILATERAL: Primary | ICD-10-CM

## 2019-07-29 ENCOUNTER — REMOTE DEVICE CLINIC VISIT (OUTPATIENT)
Dept: CARDIOLOGY CLINIC | Facility: CLINIC | Age: 69
End: 2019-07-29
Payer: COMMERCIAL

## 2019-07-29 DIAGNOSIS — Z95.0 PACEMAKER: Primary | ICD-10-CM

## 2019-07-29 PROCEDURE — 93296 REM INTERROG EVL PM/IDS: CPT | Performed by: INTERNAL MEDICINE

## 2019-07-29 PROCEDURE — 93294 REM INTERROG EVL PM/LDLS PM: CPT | Performed by: INTERNAL MEDICINE

## 2019-07-30 NOTE — PROGRESS NOTES
Results for orders placed or performed in visit on 07/29/19   Cardiac EP device report    Narrative    MDT-DUAL CHAMBER PPM (DDD MODE)  CARELINK TRANSMISSION: BATTERY ADEQUATE (11 2 YRS)  AP 11%;  100% (CHB/DDD 60)  ALL LEAD PARAMETERS WITHIN NORMAL LIMITS  NO EPISODES  NORMAL DEVICE FUNCTION   PL

## 2019-08-29 ENCOUNTER — OFFICE VISIT (OUTPATIENT)
Dept: URGENT CARE | Age: 69
End: 2019-08-29
Payer: COMMERCIAL

## 2019-08-29 VITALS
SYSTOLIC BLOOD PRESSURE: 114 MMHG | HEART RATE: 63 BPM | TEMPERATURE: 97.1 F | RESPIRATION RATE: 18 BRPM | DIASTOLIC BLOOD PRESSURE: 61 MMHG | OXYGEN SATURATION: 98 %

## 2019-08-29 DIAGNOSIS — T14.8XXA ABRASION: Primary | ICD-10-CM

## 2019-08-29 PROCEDURE — 99213 OFFICE O/P EST LOW 20 MIN: CPT | Performed by: PHYSICIAN ASSISTANT

## 2019-08-29 NOTE — PATIENT INSTRUCTIONS
Keep area clean and dry  Monitor for signs of infection  Follow up with PCP as needed  Go to ER if symptoms worsen

## 2019-08-29 NOTE — PROGRESS NOTES
[address] [facility]        NAME: You Jerome is a 77 y o  male  : 1950    MRN: [id number]  DATE: 2019  TIME: 4:19 PM    Assessment and Plan   Abrasion [T14  8XXA]  1  Abrasion           Patient Instructions     Keep area clean and dry  Monitor for signs of infection  Follow up with PCP in 3-5 days  Proceed to  ER if symptoms worsen  Chief Complaint     Chief Complaint   Patient presents with    Rash     Rash on left  side flank for 2 days  History of Present Illness       Patient, nonverbal, presents with  for complaint of rash x 2 days  Pt's  reports noticing it 2 days ago but denies any known injury although he reports that the patient is often resistant to transfers and falls occasionally  Pt's  denies other signs including rhinorrhea, vomiting, diarrhea, pruritus, fever, chills, night sweats, and cough   denies trying any palliative measures and denies any known new topical products, detergents, etc       Review of Systems   Review of Systems   Constitutional: Negative for chills, fatigue and fever  HENT: Negative for congestion, ear pain, postnasal drip, rhinorrhea and sore throat  Respiratory: Negative for cough, chest tightness and shortness of breath  Cardiovascular: Negative for chest pain  Gastrointestinal: Negative for abdominal pain, blood in stool, diarrhea, nausea and vomiting  Musculoskeletal: Negative for myalgias, neck pain and neck stiffness  Skin: Positive for rash  Negative for color change and wound  Neurological: Negative for dizziness, weakness, light-headedness, numbness and headaches  All other systems reviewed and are negative          Current Medications       Current Outpatient Medications:     acetaminophen (TYLENOL) 325 mg tablet, Take 325 mg by mouth every 6 (six) hours as needed for mild pain, Disp: , Rfl:     Butenafine HCl (LOTRIMIN ULTRA) 1 % cream, Apply topically, Disp: , Rfl:     Calcium Carbonate-Vitamin D 600-400 MG-UNIT per chew tablet, Chew 1 tablet 2 (two) times a day, Disp: , Rfl:     calcium polycarbophil (FIBERCON) 625 mg tablet, Take 625 mg by mouth daily, Disp: , Rfl:     cetirizine (ZyrTEC) 10 mg tablet, Take 10 mg by mouth daily, Disp: , Rfl:     citalopram (CeleXA) 20 mg tablet, Take 20 mg by mouth daily  , Disp: , Rfl:     guaiFENesin (ROBITUSSIN) 100 MG/5ML oral liquid, Take 200 mg by mouth 3 (three) times a day as needed for cough, Disp: , Rfl:     loperamide (IMODIUM A-D) 2 MG tablet, Take by mouth 4 (four) times a day as needed for diarrhea  , Disp: , Rfl:     meloxicam (MOBIC) 7 5 mg tablet, Take 7 5 mg by mouth daily , Disp: , Rfl:     Miconazole Nitrate 2 % AERP, Apply topically, Disp: , Rfl:     oxybutynin (DITROPAN) 5 mg tablet, Take 5 mg by mouth 2 (two) times a day, Disp: , Rfl:     PROLIA 60 MG/ML, , Disp: , Rfl:     risperiDONE (RisperDAL) 1 mg tablet, Take 0 5 mg by mouth 2 (two) times a day  , Disp: , Rfl:     Starch-Maltodextrin (DIAFOODS THICK-IT PO), Take by mouth, Disp: , Rfl:     terazosin (HYTRIN) 5 mg capsule, Take 5 mg by mouth daily at bedtime, Disp: , Rfl:     tobramycin (TOBREX) 0 3 % SOLN, Administer 2 drops to the right eye every 4 (four) hours while awake, Disp: 5 mL, Rfl: 0    Current Allergies     Allergies as of 08/29/2019    (No Known Allergies)            The following portions of the patient's history were reviewed and updated as appropriate: allergies, current medications, past family history, past medical history, past social history, past surgical history and problem list      Past Medical History:   Diagnosis Date    Developmental non-verbal disorder     Dysphagia     moderate pharyngeal dysphagia    Gait disturbance     Hearing loss     Intellectual disability     Osteoarthritis     Osteoporosis     Prostate atrophy        History reviewed  No pertinent surgical history  History reviewed   No pertinent family history  Medications have been verified  Objective   /61 (BP Location: Left arm, Patient Position: Sitting, Cuff Size: Standard)   Pulse 63   Temp (!) 97 1 °F (36 2 °C) (Temporal)   Resp 18   SpO2 98%        Physical Exam     Physical Exam   Constitutional: He is oriented to person, place, and time  He appears well-developed and well-nourished  No distress  HENT:   Head: Normocephalic and atraumatic  Eyes: Pupils are equal, round, and reactive to light  Conjunctivae and EOM are normal    Neck: Normal range of motion  Neck supple  Cardiovascular: Normal rate, regular rhythm and normal heart sounds  Pulmonary/Chest: Effort normal and breath sounds normal  No respiratory distress  Lymphadenopathy:     He has no cervical adenopathy  Neurological: He is alert and oriented to person, place, and time  No cranial nerve deficit or sensory deficit  Skin: Skin is warm and dry  Capillary refill takes less than 2 seconds  No rash noted  He is not diaphoretic  Superficial abrasion of left hip; no vesicles, swelling, fluctuance, erythema, or ecchymosis   Psychiatric: He has a normal mood and affect  His behavior is normal  Thought content normal    Nursing note and vitals reviewed

## 2019-10-29 ENCOUNTER — REMOTE DEVICE CLINIC VISIT (OUTPATIENT)
Dept: CARDIOLOGY CLINIC | Facility: CLINIC | Age: 69
End: 2019-10-29
Payer: COMMERCIAL

## 2019-10-29 DIAGNOSIS — Z95.0 PACEMAKER: Primary | ICD-10-CM

## 2019-10-29 PROCEDURE — 93294 REM INTERROG EVL PM/LDLS PM: CPT | Performed by: INTERNAL MEDICINE

## 2019-10-29 PROCEDURE — 93296 REM INTERROG EVL PM/IDS: CPT | Performed by: INTERNAL MEDICINE

## 2019-10-29 NOTE — PROGRESS NOTES
Results for orders placed or performed in visit on 10/29/19   Cardiac EP device report    Narrative    MDT-DUAL CHAMBER PPM (DDD MODE)  CARELINK TRANSMISSION: BATTERY ADEQUATE (10 9 YRS)  AP 6%;  100% (CHB/DDD 60)  ALL LEAD PARAMETERS WITHIN NORMAL LIMITS  NO EPISODES  NORMAL DEVICE FUNCTION   PL

## 2019-11-13 ENCOUNTER — DOCUMENTATION (OUTPATIENT)
Dept: AUDIOLOGY | Age: 69
End: 2019-11-13

## 2019-11-13 NOTE — PROGRESS NOTES
Progress Note    Name:  Porsha Jerome  :  1950  Age:  77 y o  Date of Evaluation: 19     Scanned documents         Christin Lujan   Clinical Audiologist

## 2019-12-10 ENCOUNTER — OFFICE VISIT (OUTPATIENT)
Dept: AUDIOLOGY | Age: 69
End: 2019-12-10
Payer: COMMERCIAL

## 2019-12-10 DIAGNOSIS — H90.3 SENSORY HEARING LOSS, BILATERAL: Primary | ICD-10-CM

## 2019-12-10 PROCEDURE — 92557 COMPREHENSIVE HEARING TEST: CPT | Performed by: AUDIOLOGIST

## 2019-12-10 PROCEDURE — 92567 TYMPANOMETRY: CPT | Performed by: AUDIOLOGIST

## 2019-12-10 NOTE — LETTER
December 10, 2019     Jase Lea MD  [address] [facility]    Patient: Cheryl Jerome   YOB: 1950   Date of Visit: 12/10/2019       Dear Dr Danny Stauffer:    Thank you for referring Celeste Forrester to me for evaluation  Below are my notes for this consultation  If you have questions, please do not hesitate to call me  I look forward to following your patient along with you  Sincerely,        Britni Ren  CC: No Recipients  Estephania Staley [place]   12/10/2019 10:57 AM  Sign at close encounter  [address] VISIT    Name:  Dakota Flores  :  1950  Age:  77 y o  Date of Evaluation: 12/10/19     History: Annual Hearing Test  Reason for visit: Dakota Flores is being seen today at the request of Dr Danny Stauffer for an evaluation of hearing  Caregiver reports no issues with hearing aids  Otoscopic Evaluation:   Right Ear: Occluded   Left Ear: Occluded    Tympanometry:   Right: Type As - reduced compliance   Left: Type As - reduced compliance    Audiogram Results:  Profound hearing loss bilaterally  *see attached audiogram      Dakota Flores is fit with an  [id number] [employer] hearing aid in the right ear only, serial number [id number]  Service warranty end date 20, L&D warranty has been used  Action:  Re-tubed hearing aid  Listening check reveals appropriate output and good sound quality  RECOMMENDATIONS:  Annual hearing eval and return as needed for hearing aid maintenance        Britni Ren , [facility]  Clinical Audiologist

## 2020-01-27 ENCOUNTER — REMOTE DEVICE CLINIC VISIT (OUTPATIENT)
Dept: CARDIOLOGY CLINIC | Facility: CLINIC | Age: 70
End: 2020-01-27
Payer: COMMERCIAL

## 2020-01-27 DIAGNOSIS — Z95.0 PRESENCE OF CARDIAC PACEMAKER: Primary | ICD-10-CM

## 2020-01-27 PROCEDURE — 93294 REM INTERROG EVL PM/LDLS PM: CPT | Performed by: INTERNAL MEDICINE

## 2020-01-27 PROCEDURE — 93296 REM INTERROG EVL PM/IDS: CPT | Performed by: INTERNAL MEDICINE

## 2020-01-27 NOTE — PROGRESS NOTES
Results for orders placed or performed in visit on 01/27/20   Cardiac EP device report    Narrative    MDT-DUAL CHAMBER PPM (DDD MODE)  CARELINK TRANSMISSION: BATTERY VOLTAGE ADEQUATE (10 6 YRS)  AP: 15 2%  : 100% (>40%~CHB)  ALL AVAILABLE LEAD PARAMETERS WITHIN NORMAL LIMITS  NO SIGNIFICANT HIGH RATE EPISODES  PACEMAKER FUNCTIONING APPROPRIATELY    [facility]

## 2020-02-28 NOTE — PROGRESS NOTES
Progress Note    Name:  Gabby Jerome  :  1950  Age:  77 y o  Date of Evaluation: 20     Scanned in HA chart part 2         Christin Lin , CCC-A  Clinical Audiologist

## 2020-04-01 ENCOUNTER — HOSPITAL ENCOUNTER (EMERGENCY)
Facility: HOSPITAL | Age: 70
Discharge: HOME | End: 2020-04-01
Attending: EMERGENCY MEDICINE | Admitting: EMERGENCY MEDICINE
Payer: COMMERCIAL

## 2020-04-01 ENCOUNTER — APPOINTMENT (EMERGENCY)
Dept: RADIOLOGY | Facility: HOSPITAL | Age: 70
End: 2020-04-01
Payer: COMMERCIAL

## 2020-04-01 VITALS
RESPIRATION RATE: 18 BRPM | OXYGEN SATURATION: 98 % | SYSTOLIC BLOOD PRESSURE: 108 MMHG | HEART RATE: 59 BPM | TEMPERATURE: 97.7 F | BODY MASS INDEX: 23.42 KG/M2 | WEIGHT: 119.93 LBS | DIASTOLIC BLOOD PRESSURE: 73 MMHG

## 2020-04-01 DIAGNOSIS — Z23 NEED FOR TETANUS BOOSTER: ICD-10-CM
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
## 2020-04-01 DIAGNOSIS — T14.8XXA CONTUSION: ICD-10-CM

## 2020-04-01 DIAGNOSIS — M25.579 ANKLE PAIN: ICD-10-CM

## 2020-04-01 PROCEDURE — 71101 X-RAY EXAM UNILAT RIBS/CHEST: CPT

## 2020-04-01 PROCEDURE — 73610 X-RAY EXAM OF ANKLE: CPT

## 2020-04-01 PROCEDURE — 90471 IMMUNIZATION ADMIN: CPT

## 2020-04-01 PROCEDURE — 99284 EMERGENCY DEPT VISIT MOD MDM: CPT | Performed by: PHYSICIAN ASSISTANT

## 2020-04-01 PROCEDURE — 73502 X-RAY EXAM HIP UNI 2-3 VIEWS: CPT

## 2020-04-01 PROCEDURE — 99283 EMERGENCY DEPT VISIT LOW MDM: CPT

## 2020-04-01 PROCEDURE — 72100 X-RAY EXAM L-S SPINE 2/3 VWS: CPT

## 2020-04-01 PROCEDURE — 90715 TDAP VACCINE 7 YRS/> IM: CPT | Performed by: PHYSICIAN ASSISTANT

## 2020-04-01 RX ORDER — ACETAMINOPHEN 325 MG/1
650 TABLET ORAL ONCE
Status: COMPLETED | OUTPATIENT
Start: 2020-04-01 | End: 2020-04-01

## 2020-04-01 RX ORDER — LIDOCAINE 50 MG/G
1 PATCH TOPICAL ONCE
Status: DISCONTINUED | OUTPATIENT
Start: 2020-04-01 | End: 2020-04-02 | Stop reason: HOSPADM

## 2020-04-01 RX ORDER — GINSENG 100 MG
1 CAPSULE ORAL ONCE
Status: COMPLETED | OUTPATIENT
Start: 2020-04-01 | End: 2020-04-01

## 2020-04-01 RX ORDER — CAPSAICIN 0.07 G/100G
CREAM TOPICAL 3 TIMES DAILY
Qty: 28.3 G | Refills: 0 | Status: SHIPPED | OUTPATIENT
Start: 2020-04-01 | End: 2022-01-05

## 2020-04-01 RX ADMIN — TETANUS TOXOID, REDUCED DIPHTHERIA TOXOID AND ACELLULAR PERTUSSIS VACCINE, ADSORBED 0.5 ML: 5; 2.5; 8; 8; 2.5 SUSPENSION INTRAMUSCULAR at 22:16

## 2020-04-01 RX ADMIN — LIDOCAINE 1 PATCH: 50 PATCH TOPICAL at 22:15

## 2020-04-01 RX ADMIN — BACITRACIN 1 SMALL APPLICATION: 500 OINTMENT TOPICAL at 22:16

## 2020-04-01 RX ADMIN — ACETAMINOPHEN 650 MG: 325 TABLET, FILM COATED ORAL at 22:16

## 2020-05-01 ENCOUNTER — HOSPITAL ENCOUNTER (EMERGENCY)
Facility: HOSPITAL | Age: 70
Discharge: HOME | End: 2020-05-01
Attending: EMERGENCY MEDICINE | Admitting: EMERGENCY MEDICINE
Payer: COMMERCIAL

## 2020-05-01 ENCOUNTER — APPOINTMENT (EMERGENCY)
Dept: CT IMAGING | Facility: HOSPITAL | Age: 70
End: 2020-05-01
Payer: COMMERCIAL

## 2020-05-01 VITALS
HEART RATE: 66 BPM | DIASTOLIC BLOOD PRESSURE: 73 MMHG | BODY MASS INDEX: 23.81 KG/M2 | WEIGHT: 121.25 LBS | SYSTOLIC BLOOD PRESSURE: 141 MMHG | RESPIRATION RATE: 18 BRPM | HEIGHT: 60 IN | OXYGEN SATURATION: 96 % | TEMPERATURE: 98.9 F
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
## 2020-05-01 PROCEDURE — 70450 CT HEAD/BRAIN W/O DYE: CPT

## 2020-05-01 PROCEDURE — 99283 EMERGENCY DEPT VISIT LOW MDM: CPT | Performed by: EMERGENCY MEDICINE

## 2020-05-01 PROCEDURE — 72125 CT NECK SPINE W/O DYE: CPT

## 2020-05-01 PROCEDURE — 99284 EMERGENCY DEPT VISIT MOD MDM: CPT

## 2020-05-04 ENCOUNTER — REMOTE DEVICE CLINIC VISIT (OUTPATIENT)
Dept: CARDIOLOGY CLINIC | Facility: CLINIC | Age: 70
End: 2020-05-04
Payer: COMMERCIAL

## 2020-05-04 DIAGNOSIS — Z95.0 CARDIAC PACEMAKER IN SITU: Primary | ICD-10-CM

## 2020-05-04 PROCEDURE — 93294 REM INTERROG EVL PM/LDLS PM: CPT | Performed by: INTERNAL MEDICINE

## 2020-05-04 PROCEDURE — 93296 REM INTERROG EVL PM/IDS: CPT | Performed by: INTERNAL MEDICINE

## 2020-05-17 ENCOUNTER — HOSPITAL ENCOUNTER (EMERGENCY)
Facility: HOSPITAL | Age: 70
Discharge: HOME | End: 2020-05-17
Attending: EMERGENCY MEDICINE | Admitting: EMERGENCY MEDICINE
Payer: COMMERCIAL

## 2020-05-17 ENCOUNTER — APPOINTMENT (EMERGENCY)
Dept: RADIOLOGY | Facility: HOSPITAL | Age: 70
End: 2020-05-17
Payer: COMMERCIAL

## 2020-05-17 VITALS
OXYGEN SATURATION: 98 % | RESPIRATION RATE: 20 BRPM | BODY MASS INDEX: 23.77 KG/M2 | SYSTOLIC BLOOD PRESSURE: 118 MMHG | DIASTOLIC BLOOD PRESSURE: 80 MMHG | WEIGHT: 121.69 LBS | HEART RATE: 83 BPM | TEMPERATURE: 97.4 F

## 2020-05-17 DIAGNOSIS — S42.402A ELBOW FRACTURE, LEFT, CLOSED, INITIAL ENCOUNTER: Primary | ICD-10-CM

## 2020-05-17 PROCEDURE — 99283 EMERGENCY DEPT VISIT LOW MDM: CPT

## 2020-05-17 PROCEDURE — 29105 APPLICATION LONG ARM SPLINT: CPT | Performed by: EMERGENCY MEDICINE

## 2020-05-17 PROCEDURE — 99283 EMERGENCY DEPT VISIT LOW MDM: CPT | Performed by: EMERGENCY MEDICINE

## 2020-05-17 PROCEDURE — 73080 X-RAY EXAM OF ELBOW: CPT

## 2020-05-17 RX ORDER — ACETAMINOPHEN 325 MG/1
650 TABLET ORAL ONCE
Status: COMPLETED | OUTPATIENT
Start: 2020-05-17 | End: 2020-05-17

## 2020-05-17 RX ADMIN — ACETAMINOPHEN 650 MG: 325 TABLET, FILM COATED ORAL at 07:10

## 2020-05-22 ENCOUNTER — OFFICE VISIT (OUTPATIENT)
Dept: OBGYN CLINIC | Facility: HOSPITAL | Age: 70
End: 2020-05-22
Payer: COMMERCIAL

## 2020-05-22 VITALS
SYSTOLIC BLOOD PRESSURE: 114 MMHG | HEIGHT: 60 IN | DIASTOLIC BLOOD PRESSURE: 77 MMHG | BODY MASS INDEX: 23.75 KG/M2 | HEART RATE: 72 BPM | WEIGHT: 121 LBS

## 2020-05-22 DIAGNOSIS — M77.12 LATERAL EPICONDYLITIS OF LEFT ELBOW: Primary | ICD-10-CM

## 2020-05-22 PROCEDURE — 99203 OFFICE O/P NEW LOW 30 MIN: CPT | Performed by: SURGERY

## 2020-07-09 ENCOUNTER — OFFICE VISIT (OUTPATIENT)
Dept: AUDIOLOGY | Age: 70
End: 2020-07-09

## 2020-07-09 DIAGNOSIS — H90.3 SENSORINEURAL HEARING LOSS, BILATERAL: Primary | ICD-10-CM

## 2020-07-09 NOTE — PROGRESS NOTES
HEARING EVALUATION    Name:  Alhaji Jerome  :  1950  Age:  72 y o  Date of Evaluation: 20     History: Annual Hearing Test  Reason for visit: Rosa Norwood is being seen today at the request of Dr Lashanda Cortes for an evaluation of hearing  Wilfredo Yi has a longstanding history of bilateral hearing loss, and is aided in the right ear  Caregiver reports feedback from hearing aid  EVALUATION:    Otoscopic Evaluation:   Right Ear: Occluded   Left Ear: Occluded    Tympanometry:   Right: occluded   Left: occluded    Audiogram Results:  Did not test - cerumen occlusion, bilaterally  *see attached audiogram       Hearing Aid Visit:    Name:  Alhaji Jerome  :  1950  Age:  72 y o  Date of Evaluation: 20     Alhaji Jerome is being seen for a hearing aid visit  Patient is fit with Misty Pulling SP4 hearing aid in the right ear only, serial number [id number]  Service warranty end date 20, L&D warranty has been used  Caregiver reports feedback  Action:  Hearing aid cleaned and checked  Earhook and tubing changed  Good sound quality  Feedback likely due to cerumen occlusion  RECOMMENDATIONS:  Return to [facility]  for F/U, Ear Cleaning, Copy to Patient/Caregiver and Hearing evaluation to be scheduled following ear cleaning, Caregiver counseled regarding need to schedule HAV for tubing changes as needed  PATIENT EDUCATION:   Discussed results and recommendations with caregiver  Questions were addressed and the patient was encouraged to contact our department should concerns arise        Christin Newton   Clinical Audiologist

## 2020-07-16 ENCOUNTER — TELEPHONE (OUTPATIENT)
Dept: OBGYN CLINIC | Facility: HOSPITAL | Age: 70
End: 2020-07-16

## 2020-07-16 NOTE — TELEPHONE ENCOUNTER
Left a voice mail for the patient to call us back  If the patient calls back, please ask the patient the COVID 19 screening questions and document on the appointment line      Thank you

## 2020-08-04 ENCOUNTER — REMOTE DEVICE CLINIC VISIT (OUTPATIENT)
Dept: CARDIOLOGY CLINIC | Facility: CLINIC | Age: 70
End: 2020-08-04
Payer: COMMERCIAL

## 2020-08-04 DIAGNOSIS — Z95.0 PRESENCE OF PERMANENT CARDIAC PACEMAKER: Primary | ICD-10-CM

## 2020-08-04 PROCEDURE — 93296 REM INTERROG EVL PM/IDS: CPT | Performed by: INTERNAL MEDICINE

## 2020-08-04 PROCEDURE — 93294 REM INTERROG EVL PM/LDLS PM: CPT | Performed by: INTERNAL MEDICINE

## 2020-08-04 NOTE — PROGRESS NOTES
MDT-DUAL CHAMBER PPM/ACTIVE SYSTEM IS MRI CONDITIONAL   CARELINK TRANSMISSION:  BATTERY VOLTAGE ADEQUATE (10 1 YR)    AP 11 7%  100% (>40%/CHB)   ALL AVAILABLE LEAD PARAMETERS WITHIN NORMAL LIMITS   NO HIGH RATE EPISODES   NORMAL DEVICE FUNCTION   RG

## 2020-09-10 ENCOUNTER — HOSPITAL ENCOUNTER (OUTPATIENT)
Dept: RADIOLOGY | Facility: HOSPITAL | Age: 70
Discharge: HOME | End: 2020-09-10
Payer: COMMERCIAL

## 2020-09-10 ENCOUNTER — TRANSCRIBE ORDERS (OUTPATIENT)
Dept: ADMINISTRATIVE | Facility: HOSPITAL | Age: 70
End: 2020-09-10

## 2020-09-10 DIAGNOSIS — R10.9 ABDOMINAL PAIN, UNSPECIFIED ABDOMINAL LOCATION: ICD-10-CM

## 2020-09-10 DIAGNOSIS — R10.9 ABDOMINAL PAIN, UNSPECIFIED ABDOMINAL LOCATION: Primary | ICD-10-CM

## 2020-09-10 PROCEDURE — 74018 RADEX ABDOMEN 1 VIEW: CPT

## 2020-09-29 ENCOUNTER — HOSPITAL ENCOUNTER (OUTPATIENT)
Dept: GASTROENTEROLOGY | Facility: AMBULARY SURGERY CENTER | Age: 70
Setting detail: OUTPATIENT SURGERY
Discharge: HOME | End: 2020-09-29

## 2020-09-30 ENCOUNTER — IN-CLINIC DEVICE VISIT (OUTPATIENT)
Dept: CARDIOLOGY CLINIC | Facility: CLINIC | Age: 70
End: 2020-09-30
Payer: COMMERCIAL

## 2020-09-30 DIAGNOSIS — Z95.0 CARDIAC PACEMAKER IN SITU: Primary | ICD-10-CM

## 2020-09-30 PROCEDURE — 93280 PM DEVICE PROGR EVAL DUAL: CPT | Performed by: INTERNAL MEDICINE

## 2020-09-30 NOTE — PROGRESS NOTES
Results for orders placed or performed in visit on 09/30/20   Cardiac EP device report    Narrative    MDT-DUAL [address] INTERROGATED IN THE [employer] OFFICE  BATTERY VOLTAGE ADEQUATE (10 YRS)  AP-11%, -100% (DEPENDENT/CHB)  ALL LEAD PARAMETERS WITHIN NORMAL LIMITS  NO SIGNIFICANT HIGH RATE EPISODES  NORMAL DEVICE FUNCTION   GV

## 2020-10-27 ENCOUNTER — APPOINTMENT (EMERGENCY)
Dept: RADIOLOGY | Facility: HOSPITAL | Age: 70
End: 2020-10-27
Payer: COMMERCIAL

## 2020-10-27 ENCOUNTER — HOSPITAL ENCOUNTER (EMERGENCY)
Facility: HOSPITAL | Age: 70
Discharge: HOME | End: 2020-10-27
Attending: EMERGENCY MEDICINE | Admitting: EMERGENCY MEDICINE
Payer: COMMERCIAL

## 2020-10-27 VITALS
HEART RATE: 80 BPM | SYSTOLIC BLOOD PRESSURE: 126 MMHG | WEIGHT: 119.49 LBS | OXYGEN SATURATION: 96 % | BODY MASS INDEX: 23.46 KG/M2 | HEIGHT: 60 IN | TEMPERATURE: 97.9 F | RESPIRATION RATE: 20 BRPM | DIASTOLIC BLOOD PRESSURE: 74 MMHG

## 2020-10-27 DIAGNOSIS — S30.0XXA CONTUSION OF BUTTOCK, INITIAL ENCOUNTER: ICD-10-CM
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
## 2020-10-27 LAB
HBV SURFACE AG SER QL: NORMAL
HCV AB SER QL: NORMAL
HIV 1+2 AB+HIV1 P24 AG SERPL QL IA: NORMAL
HIV1 P24 AG SER QL: NORMAL

## 2020-10-27 PROCEDURE — 72220 X-RAY EXAM SACRUM TAILBONE: CPT

## 2020-10-27 PROCEDURE — 99282 EMERGENCY DEPT VISIT SF MDM: CPT | Performed by: EMERGENCY MEDICINE

## 2020-10-27 PROCEDURE — 36415 COLL VENOUS BLD VENIPUNCTURE: CPT | Performed by: EMERGENCY MEDICINE

## 2020-10-27 PROCEDURE — 86803 HEPATITIS C AB TEST: CPT | Performed by: EMERGENCY MEDICINE

## 2020-10-27 PROCEDURE — 87806 HIV AG W/HIV1&2 ANTB W/OPTIC: CPT | Performed by: EMERGENCY MEDICINE

## 2020-10-27 PROCEDURE — 99284 EMERGENCY DEPT VISIT MOD MDM: CPT

## 2020-10-27 PROCEDURE — 87340 HEPATITIS B SURFACE AG IA: CPT | Performed by: EMERGENCY MEDICINE

## 2020-11-04 ENCOUNTER — ANESTHESIA EVENT (OUTPATIENT)
Dept: GASTROENTEROLOGY | Facility: HOSPITAL | Age: 70
End: 2020-11-04

## 2020-11-05 ENCOUNTER — ANESTHESIA (OUTPATIENT)
Dept: GASTROENTEROLOGY | Facility: HOSPITAL | Age: 70
End: 2020-11-05

## 2020-11-05 ENCOUNTER — HOSPITAL ENCOUNTER (OUTPATIENT)
Dept: GASTROENTEROLOGY | Facility: HOSPITAL | Age: 70
Setting detail: OUTPATIENT SURGERY
Discharge: HOME | End: 2020-11-05
Attending: INTERNAL MEDICINE | Admitting: INTERNAL MEDICINE
Payer: COMMERCIAL

## 2020-11-05 VITALS
TEMPERATURE: 95.9 F | OXYGEN SATURATION: 100 % | HEART RATE: 74 BPM | DIASTOLIC BLOOD PRESSURE: 85 MMHG | BODY MASS INDEX: 23.36 KG/M2 | RESPIRATION RATE: 18 BRPM | WEIGHT: 119 LBS | HEIGHT: 60 IN | SYSTOLIC BLOOD PRESSURE: 110 MMHG

## 2020-11-05 VITALS — HEART RATE: 74 BPM

## 2020-11-05 DIAGNOSIS — K63.5 POLYP OF COLON, UNSPECIFIED PART OF COLON, UNSPECIFIED TYPE: ICD-10-CM

## 2020-11-05 LAB — SARS-COV-2 RNA RESP QL NAA+PROBE: NEGATIVE

## 2020-11-05 PROCEDURE — U0003 INFECTIOUS AGENT DETECTION BY NUCLEIC ACID (DNA OR RNA); SEVERE ACUTE RESPIRATORY SYNDROME CORONAVIRUS 2 (SARS-COV-2) (CORONAVIRUS DISEASE [COVID-19]), AMPLIFIED PROBE TECHNIQUE, MAKING USE OF HIGH THROUGHPUT TECHNOLOGIES AS DESCRIBED BY CMS-2020-01-R: HCPCS | Performed by: INTERNAL MEDICINE

## 2020-11-05 PROCEDURE — 88305 TISSUE EXAM BY PATHOLOGIST: CPT | Performed by: PATHOLOGY

## 2020-11-05 RX ORDER — PROPOFOL 10 MG/ML
INJECTION, EMULSION INTRAVENOUS AS NEEDED
Status: DISCONTINUED | OUTPATIENT
Start: 2020-11-05 | End: 2020-11-05

## 2020-11-05 RX ORDER — LIDOCAINE HYDROCHLORIDE 10 MG/ML
0.5 INJECTION, SOLUTION EPIDURAL; INFILTRATION; INTRACAUDAL; PERINEURAL ONCE AS NEEDED
Status: CANCELLED | OUTPATIENT
Start: 2020-11-05

## 2020-11-05 RX ORDER — SODIUM CHLORIDE 9 MG/ML
INJECTION, SOLUTION INTRAVENOUS CONTINUOUS PRN
Status: DISCONTINUED | OUTPATIENT
Start: 2020-11-05 | End: 2020-11-05

## 2020-11-05 RX ORDER — PROPOFOL 10 MG/ML
INJECTION, EMULSION INTRAVENOUS CONTINUOUS PRN
Status: DISCONTINUED | OUTPATIENT
Start: 2020-11-05 | End: 2020-11-05

## 2020-11-05 RX ORDER — SODIUM CHLORIDE, SODIUM LACTATE, POTASSIUM CHLORIDE, CALCIUM CHLORIDE 600; 310; 30; 20 MG/100ML; MG/100ML; MG/100ML; MG/100ML
50 INJECTION, SOLUTION INTRAVENOUS CONTINUOUS
Status: CANCELLED | OUTPATIENT
Start: 2020-11-05

## 2020-11-05 RX ADMIN — PROPOFOL 50 MG: 10 INJECTION, EMULSION INTRAVENOUS at 11:08

## 2020-11-05 RX ADMIN — PROPOFOL 150 MCG/KG/MIN: 10 INJECTION, EMULSION INTRAVENOUS at 11:08

## 2020-11-05 RX ADMIN — SODIUM CHLORIDE: 0.9 INJECTION, SOLUTION INTRAVENOUS at 10:22

## 2020-11-05 RX ADMIN — PHENYLEPHRINE HYDROCHLORIDE 100 MCG: 10 INJECTION INTRAVENOUS at 11:18

## 2020-11-05 RX ADMIN — PHENYLEPHRINE HYDROCHLORIDE 100 MCG: 10 INJECTION INTRAVENOUS at 11:17

## 2020-11-05 RX ADMIN — PHENYLEPHRINE HYDROCHLORIDE 100 MCG: 10 INJECTION INTRAVENOUS at 11:10

## 2020-11-05 RX ADMIN — PHENYLEPHRINE HYDROCHLORIDE 100 MCG: 10 INJECTION INTRAVENOUS at 11:12

## 2020-12-11 ENCOUNTER — TELEMEDICINE (OUTPATIENT)
Dept: CARDIOLOGY CLINIC | Facility: CLINIC | Age: 70
End: 2020-12-11

## 2020-12-11 DIAGNOSIS — Z95.0 PACEMAKER: Primary | ICD-10-CM

## 2020-12-11 PROCEDURE — RECHECK: Performed by: PHYSICIAN ASSISTANT

## 2021-02-12 ENCOUNTER — HOSPITAL ENCOUNTER (EMERGENCY)
Facility: HOSPITAL | Age: 71
Discharge: HOME | End: 2021-02-12
Attending: EMERGENCY MEDICINE | Admitting: EMERGENCY MEDICINE
Payer: COMMERCIAL

## 2021-02-12 VITALS
RESPIRATION RATE: 16 BRPM | HEART RATE: 66 BPM | SYSTOLIC BLOOD PRESSURE: 144 MMHG | OXYGEN SATURATION: 97 % | TEMPERATURE: 98 F | DIASTOLIC BLOOD PRESSURE: 78 MMHG

## 2021-02-12 DIAGNOSIS — S00.81XA ABRASION OF FACE, INITIAL ENCOUNTER: ICD-10-CM

## 2021-02-12 DIAGNOSIS — S00.03XA CONTUSION OF SCALP, INITIAL ENCOUNTER: Primary | ICD-10-CM

## 2021-02-12 PROCEDURE — 99283 EMERGENCY DEPT VISIT LOW MDM: CPT

## 2021-02-12 PROCEDURE — 99282 EMERGENCY DEPT VISIT SF MDM: CPT | Performed by: EMERGENCY MEDICINE

## 2021-02-12 NOTE — ED PROVIDER NOTES
History  Chief Complaint   Patient presents with    Head Injury     pt hit head on wall using grab bar to stand from toilet  61-year-old gentleman with developmental delay lives in a group home brought in because he accidentally hitting the side of his forehead face on a wall while using a grabbed for to get up from sitting on the toilet  According to his caregiver he is not on any blood thinners he did not get knocked out he is acting like himself he does have 3 small areas of redness on his forehead another spot just above his eye and another spot on his wound left cheek  History provided by:  Caregiver  History limited by:  Patient nonverbal   used: No    Facial Injury  Mechanism of injury:  Direct blow  Location:  Face and forehead  Time since incident:  1 hour  Pain details:     Quality:  Unable to specify    Severity:  Unable to specify    Timing:  Constant    Progression:  Unchanged  Foreign body present:  No foreign bodies  Ineffective treatments:  None tried  Associated symptoms: no altered mental status, no loss of consciousness, no rhinorrhea and no vomiting    Risk factors: no alcohol use and no concern for non-accidental trauma        Prior to Admission Medications   Prescriptions Last Dose Informant Patient Reported? Taking?    Butenafine HCl (LOTRIMIN ULTRA) 1 % cream  Outside Facility (Specify) Yes No   Sig: Apply topically   Calcium Carbonate-Vitamin D 600-400 MG-UNIT per chew tablet  Outside Facility (Specify) Yes No   Sig: Chew 1 tablet 2 (two) times a day   Miconazole Nitrate 2 % AERP  Outside Facility (Specify) Yes No   Sig: Apply topically   PROLIA 60 MG/ML  Outside Facility (Specify) Yes No   Starch-Maltodextrin (DIAFOODS THICK-IT PO)  Outside Facility (Specify) Yes No   Sig: Take by mouth   acetaminophen (TYLENOL) 325 mg tablet  Outside Facility (Specify) Yes No   Sig: Take 325 mg by mouth every 6 (six) hours as needed for mild pain   calcium polycarbophil (FIBERCON) 625 mg tablet  Outside Facility (Specify) Yes No   Sig: Take 625 mg by mouth daily   capsicum (ZOSTRIX) 0 075 % topical cream   No No   Sig: Apply topically 3 (three) times a day   carbidopa-levodopa (SINEMET)  mg per tablet   Yes No   cetirizine (ZyrTEC) 10 mg tablet  Outside Facility (Specify) Yes No   Sig: Take 10 mg by mouth daily   citalopram (CeleXA) 20 mg tablet  Outside Facility (Specify) Yes No   Sig: Take 20 mg by mouth daily     guaiFENesin (ROBITUSSIN) 100 MG/5ML oral liquid  Outside Facility (Specify) Yes No   Sig: Take 200 mg by mouth 3 (three) times a day as needed for cough   loperamide (IMODIUM A-D) 2 MG tablet  Outside Facility (Specify) Yes No   Sig: Take by mouth 4 (four) times a day as needed for diarrhea     meloxicam (MOBIC) 7 5 mg tablet   Yes No   Sig: Take 7 5 mg by mouth daily    oxybutynin (DITROPAN) 5 mg tablet  Outside Facility (Specify) Yes No   Sig: Take 5 mg by mouth 2 (two) times a day   risperiDONE (RisperDAL) 1 mg tablet  Outside Facility (Specify) Yes No   Sig: Take 0 5 mg by mouth 2 (two) times a day     terazosin (HYTRIN) 5 mg capsule  Outside Facility (Specify) Yes No   Sig: Take 5 mg by mouth daily at bedtime   tobramycin (TOBREX) 0 3 % SOLN   No No   Sig: Administer 2 drops to the right eye every 4 (four) hours while awake      Facility-Administered Medications: None       Past Medical History:   Diagnosis Date    Developmental non-verbal disorder     Dysphagia     moderate pharyngeal dysphagia    Gait disturbance     Hearing loss     Intellectual disability     Osteoarthritis     Osteoporosis     Prostate atrophy        History reviewed  No pertinent surgical history  History reviewed  No pertinent family history  I have reviewed and agree with the history as documented      E-Cigarette/Vaping     E-Cigarette/Vaping Substances     Social History     Tobacco Use    Smoking status: Never Smoker    Smokeless tobacco: Never Used   Substance Use Topics    Alcohol use: No    Drug use: No       Review of Systems   Unable to perform ROS: Patient nonverbal   HENT: Negative for rhinorrhea  Gastrointestinal: Negative for vomiting  Neurological: Negative for loss of consciousness  Physical Exam  Physical Exam  Vitals signs and nursing note reviewed  Constitutional:       Appearance: He is well-developed  He is not toxic-appearing  HENT:      Head: Normocephalic and atraumatic  Right Ear: Tympanic membrane normal       Left Ear: Tympanic membrane normal       Nose: Nose normal    Eyes:      General: Lids are normal       Conjunctiva/sclera: Conjunctivae normal       Pupils: Pupils are equal, round, and reactive to light  Neck:      Musculoskeletal: Normal range of motion and neck supple  Vascular: No carotid bruit or JVD  Trachea: Trachea normal    Cardiovascular:      Rate and Rhythm: Normal rate and regular rhythm  No extrasystoles are present  Heart sounds: Normal heart sounds  Pulmonary:      Effort: Pulmonary effort is normal       Breath sounds: No decreased breath sounds, wheezing, rhonchi or rales  Chest:      Chest wall: No deformity or tenderness  Abdominal:      General: Bowel sounds are normal       Palpations: Abdomen is soft  Tenderness: There is no abdominal tenderness  There is no guarding or rebound  Musculoskeletal:      Right shoulder: He exhibits normal range of motion, no tenderness, no swelling and no deformity  Cervical back: Normal  He exhibits normal range of motion, no tenderness, no bony tenderness and no deformity  Lymphadenopathy:      Cervical: No cervical adenopathy  Skin:     General: Skin is warm and dry  Neurological:      Mental Status: He is alert  Cranial Nerves: No cranial nerve deficit  Sensory: No sensory deficit  Psychiatric:         Speech: Speech normal          Behavior: Behavior normal          Thought Content:  Thought content normal  Judgment: Judgment normal          Vital Signs  ED Triage Vitals [02/12/21 1741]   Temperature Pulse Respirations Blood Pressure SpO2   98 °F (36 7 °C) 66 16 144/78 97 %      Temp Source Heart Rate Source Patient Position - Orthostatic VS BP Location FiO2 (%)   Temporal -- -- -- --      Pain Score       No Pain           Vitals:    02/12/21 1741   BP: 144/78   Pulse: 66         Visual Acuity      ED Medications  Medications - No data to display    Diagnostic Studies  Results Reviewed     None                 No orders to display              Procedures  Procedures         ED Course                                           MDM  Number of Diagnoses or Management Options  Abrasion of face, initial encounter: new and does not require workup  Contusion of scalp, initial encounter: new and does not require workup  Patient Progress  Patient progress: stable      Disposition  Final diagnoses:   Contusion of scalp, initial encounter   Abrasion of face, initial encounter     Time reflects when diagnosis was documented in both MDM as applicable and the Disposition within this note     Time User Action Codes Description Comment    2/12/2021  5:52 PM Ankita MORGAN Add [S00 [id number]] Contusion of scalp, initial encounter     2/12/2021  5:52 PM Luis Thomas Add [[id number]] Abrasion of face, initial encounter       ED Disposition     ED Disposition Condition Date/Time Comment    Discharge Stable Fri Feb 12, 2021  5:52 PM Gabby Salazar Deltito discharge to home/self care  Follow-up Information     Follow up With Specialties Details Why Contact Info    Roly Lr MD Family Medicine Schedule an appointment as soon as possible for a visit  As needed [id number] [phone]            Patient's Medications   Discharge Prescriptions    No medications on file     No discharge procedures on file      PDMP Review       Value Time User    PDMP Reviewed  Yes 5/17/2020  7:00 AM Jessica Springer Mio Nguyen MD          ED Provider  Electronically Signed by           Curtis Gutierrez DO  02/12/21 3213

## 2021-02-23 ENCOUNTER — TRANSCRIBE ORDERS (OUTPATIENT)
Dept: ADMINISTRATIVE | Facility: HOSPITAL | Age: 71
End: 2021-02-23

## 2021-02-23 ENCOUNTER — HOSPITAL ENCOUNTER (OUTPATIENT)
Dept: RADIOLOGY | Facility: HOSPITAL | Age: 71
Discharge: HOME | End: 2021-02-23
Payer: COMMERCIAL

## 2021-02-23 DIAGNOSIS — R05.9 COUGH: ICD-10-CM

## 2021-02-23 DIAGNOSIS — R05.9 COUGH: Primary | ICD-10-CM

## 2021-02-23 PROCEDURE — U0005 INFEC AGEN DETEC AMPLI PROBE: HCPCS | Performed by: FAMILY MEDICINE

## 2021-02-23 PROCEDURE — U0003 INFECTIOUS AGENT DETECTION BY NUCLEIC ACID (DNA OR RNA); SEVERE ACUTE RESPIRATORY SYNDROME CORONAVIRUS 2 (SARS-COV-2) (CORONAVIRUS DISEASE [COVID-19]), AMPLIFIED PROBE TECHNIQUE, MAKING USE OF HIGH THROUGHPUT TECHNOLOGIES AS DESCRIBED BY CMS-2020-01-R: HCPCS | Performed by: FAMILY MEDICINE

## 2021-02-23 PROCEDURE — 71046 X-RAY EXAM CHEST 2 VIEWS: CPT

## 2021-02-24 LAB — SARS-COV-2 RNA RESP QL NAA+PROBE: POSITIVE

## 2021-02-26 ENCOUNTER — TRANSCRIBE ORDERS (OUTPATIENT)
Dept: ADMINISTRATIVE | Facility: HOSPITAL | Age: 71
End: 2021-02-26

## 2021-02-26 DIAGNOSIS — R13.10 DIFFICULTY SWALLOWING: Primary | ICD-10-CM

## 2021-03-11 DIAGNOSIS — U07.1 LAB TEST POSITIVE FOR DETECTION OF COVID-19 VIRUS: ICD-10-CM

## 2021-03-11 PROCEDURE — U0003 INFECTIOUS AGENT DETECTION BY NUCLEIC ACID (DNA OR RNA); SEVERE ACUTE RESPIRATORY SYNDROME CORONAVIRUS 2 (SARS-COV-2) (CORONAVIRUS DISEASE [COVID-19]), AMPLIFIED PROBE TECHNIQUE, MAKING USE OF HIGH THROUGHPUT TECHNOLOGIES AS DESCRIBED BY CMS-2020-01-R: HCPCS | Performed by: FAMILY MEDICINE

## 2021-03-11 PROCEDURE — U0005 INFEC AGEN DETEC AMPLI PROBE: HCPCS | Performed by: FAMILY MEDICINE

## 2021-03-12 LAB — SARS-COV-2 RNA RESP QL NAA+PROBE: POSITIVE

## 2021-03-19 ENCOUNTER — HOSPITAL ENCOUNTER (OUTPATIENT)
Dept: RADIOLOGY | Facility: HOSPITAL | Age: 71
Discharge: HOME | End: 2021-03-19
Payer: COMMERCIAL

## 2021-03-19 DIAGNOSIS — R13.10 DIFFICULTY SWALLOWING: ICD-10-CM

## 2021-03-19 PROCEDURE — 74230 X-RAY XM SWLNG FUNCJ C+: CPT

## 2021-03-19 PROCEDURE — 92611 MOTION FLUOROSCOPY/SWALLOW: CPT

## 2021-03-19 NOTE — PROCEDURES
Video Swallow Study      Patient Name: Haley Pottsito  GYPTI'Z Date: 3/19/2021        Past Medical History  Past Medical History:   Diagnosis Date    Developmental non-verbal disorder     Dysphagia     moderate pharyngeal dysphagia    Gait disturbance     Hearing loss     Intellectual disability     Osteoarthritis     Osteoporosis     Prostate atrophy         Past Surgical History  No past surgical history on file  General Information:    73 yo gentleman referred to [facility]  for a VBS by Dr Shanthi Francois for dysphagia w/  c/o coughing  Pt seen by speech pathology recently and diet was changed to mechanically chopped w/ nectar thick liquids  Pt takes his meds crushed  Pt tested COVID + 2/24/21 and completed 14+ days of quarantine  Cognition:  Impaired, alert, cooperative  Speech/Swallow Mech: Oral motor movements appeared Functional ROM; Dentition was  adequate; Cough was dry Respiratory Status: WFL on RA;   Current diet: mechanically chopped w/ NTL  Prior VBS none in [employer] system  Pt was seen in radiology for a Video Barium Swallow Study, seated in the upright position and viewed laterally with the following consistencies: puree, soft/solid (nutrigrain bar), hard solid (cookie), HTL by tsp, NTL by straw and thin liquids by cup and straw  Results are as follows:     **Images are available for review on PACS          Oral Stage: Mild-moderately impaired   pt was able to bite cookie  Munching mastication noted w/ tongue thrust manipulation and transfer  Pt able to drink from straw; labial leakage noted from cup- caregiver stated pt usually drinks from a straw  Quick transfers noted w/ puree and liquids  Oral residue noted w/ secondary transfer and swallows                Pharyngeal Stage: Mild-moderately impaired   swallow initiation was timely w/ complete laryngeal excursion and airway closure; incomplete epiglottic inversion due to lordotic curvature of cervical spine  Mild vallecular retention w/ foods but usually cleared w/ secondary swallows  Questionable trace penetration w/ trace aspiration noted w/ thin liquids by cup- no related coughing  Dry cough was noted throughout study but no penetration or aspiration observed at the time of coughing  Esophageal Stage:   briefly assessed; delayed clearance in distal esophagus noted  No overt abnormality  Assessment Summary:   Mild-moderate oral/pharyngeal dysphagia as described above  No penetration or aspiration observed w/ thick and thin liquids by straw; trace amt of silent aspiration noted w/ cup sips of thin liquids, however pt usually drinks from a straw per caretaker  Coughing was noted during study w/o evidence of penetration or aspiration at the time      Diagnosis/Prognosis:            Recommendations:   Dime-size soft diet   Ok for thin liquids by straw  Aspiration precautions  Cont meds crushed    April Carter Mccollum MA CCC-SLP  Speech Pathologist  [place] license # [facility] ([facility] [id number]  [place] license # [id number]  Available via [employer]

## 2021-04-07 ENCOUNTER — HOSPITAL ENCOUNTER (EMERGENCY)
Facility: HOSPITAL | Age: 71
Discharge: HOME | End: 2021-04-07
Attending: EMERGENCY MEDICINE
Payer: COMMERCIAL

## 2021-04-07 VITALS
TEMPERATURE: 98.1 F | OXYGEN SATURATION: 92 % | DIASTOLIC BLOOD PRESSURE: 71 MMHG | SYSTOLIC BLOOD PRESSURE: 104 MMHG | HEART RATE: 68 BPM | RESPIRATION RATE: 20 BRPM

## 2021-04-07 DIAGNOSIS — R46.89 CHANGE IN BEHAVIOR: Primary | ICD-10-CM

## 2021-04-07 LAB
ALBUMIN SERPL BCP-MCNC: 2.8 G/DL (ref 3.5–5)
ALP SERPL-CCNC: 108 U/L (ref 46–116)
ALT SERPL W P-5'-P-CCNC: 12 U/L (ref 12–78)
ANION GAP SERPL CALCULATED.3IONS-SCNC: 6 MMOL/L (ref 4–13)
AST SERPL W P-5'-P-CCNC: 14 U/L (ref 5–45)
BASOPHILS # BLD AUTO: 0.06 THOUSANDS/ΜL (ref 0–0.1)
BASOPHILS NFR BLD AUTO: 1 % (ref 0–1)
BILIRUB SERPL-MCNC: 0.25 MG/DL (ref 0.2–1)
BILIRUB UR QL STRIP: NEGATIVE
BUN SERPL-MCNC: 24 MG/DL (ref 5–25)
CALCIUM ALBUM COR SERPL-MCNC: 9.7 MG/DL (ref 8.3–10.1)
CALCIUM SERPL-MCNC: 8.7 MG/DL (ref 8.3–10.1)
CHLORIDE SERPL-SCNC: 105 MMOL/L (ref 100–108)
CLARITY UR: CLEAR
CO2 SERPL-SCNC: 31 MMOL/L (ref 21–32)
COLOR UR: YELLOW
CREAT SERPL-MCNC: 0.98 MG/DL (ref 0.6–1.3)
EOSINOPHIL # BLD AUTO: 0.33 THOUSAND/ΜL (ref 0–0.61)
EOSINOPHIL NFR BLD AUTO: 5 % (ref 0–6)
ERYTHROCYTE [DISTWIDTH] IN BLOOD BY AUTOMATED COUNT: 12.4 % (ref 11.6–15.1)
GFR SERPL CREATININE-BSD FRML MDRD: 78 ML/MIN/1.73SQ M
GLUCOSE SERPL-MCNC: 97 MG/DL (ref 65–140)
GLUCOSE UR STRIP-MCNC: NEGATIVE MG/DL
HCT VFR BLD AUTO: 42.5 % (ref 36.5–49.3)
HGB BLD-MCNC: 13.8 G/DL (ref 12–17)
HGB UR QL STRIP.AUTO: NEGATIVE
IMM GRANULOCYTES # BLD AUTO: 0.04 THOUSAND/UL (ref 0–0.2)
IMM GRANULOCYTES NFR BLD AUTO: 1 % (ref 0–2)
KETONES UR STRIP-MCNC: NEGATIVE MG/DL
LEUKOCYTE ESTERASE UR QL STRIP: NEGATIVE
LYMPHOCYTES # BLD AUTO: 1.99 THOUSANDS/ΜL (ref 0.6–4.47)
LYMPHOCYTES NFR BLD AUTO: 27 % (ref 14–44)
MCH RBC QN AUTO: 30 PG (ref 26.8–34.3)
MCHC RBC AUTO-ENTMCNC: 32.5 G/DL (ref 31.4–37.4)
MCV RBC AUTO: 92 FL (ref 82–98)
MONOCYTES # BLD AUTO: 0.75 THOUSAND/ΜL (ref 0.17–1.22)
MONOCYTES NFR BLD AUTO: 10 % (ref 4–12)
NEUTROPHILS # BLD AUTO: 4.21 THOUSANDS/ΜL (ref 1.85–7.62)
NEUTS SEG NFR BLD AUTO: 56 % (ref 43–75)
NITRITE UR QL STRIP: NEGATIVE
NRBC BLD AUTO-RTO: 0 /100 WBCS
PH UR STRIP.AUTO: 6 [PH] (ref 4.5–8)
PLATELET # BLD AUTO: 373 THOUSANDS/UL (ref 149–390)
PMV BLD AUTO: 8.4 FL (ref 8.9–12.7)
POTASSIUM SERPL-SCNC: 4.6 MMOL/L (ref 3.5–5.3)
PROT SERPL-MCNC: 7.1 G/DL (ref 6.4–8.2)
PROT UR STRIP-MCNC: NEGATIVE MG/DL
RBC # BLD AUTO: 4.6 MILLION/UL (ref 3.88–5.62)
SODIUM SERPL-SCNC: 142 MMOL/L (ref 136–145)
SP GR UR STRIP.AUTO: >=1.03 (ref 1–1.03)
UROBILINOGEN UR QL STRIP.AUTO: 0.2 E.U./DL
WBC # BLD AUTO: 7.38 THOUSAND/UL (ref 4.31–10.16)

## 2021-04-07 PROCEDURE — 80053 COMPREHEN METABOLIC PANEL: CPT | Performed by: EMERGENCY MEDICINE

## 2021-04-07 PROCEDURE — 99284 EMERGENCY DEPT VISIT MOD MDM: CPT | Performed by: EMERGENCY MEDICINE

## 2021-04-07 PROCEDURE — 36415 COLL VENOUS BLD VENIPUNCTURE: CPT | Performed by: EMERGENCY MEDICINE

## 2021-04-07 PROCEDURE — 85025 COMPLETE CBC W/AUTO DIFF WBC: CPT | Performed by: EMERGENCY MEDICINE

## 2021-04-07 PROCEDURE — 99283 EMERGENCY DEPT VISIT LOW MDM: CPT

## 2021-04-07 PROCEDURE — 81003 URINALYSIS AUTO W/O SCOPE: CPT

## 2021-04-07 NOTE — ED PROVIDER NOTES
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, intellectual disability, resides at group home, arrives with some of his group home support staff for reported change in his ability to participate with transfers  I spoke with Meño Betts, the patient's nurse at his group home who tells me that over the last few months the patient has become increasingly difficult for staff to transfer  He is wheelchair bound most of the time and unable to stand or walk on his own at baseline but until a few months ago was able to somewhat help with transfers by shifting his weight  Over the last few months he has become more uncooperative with transfers, and over the last 3 days appears to [address] when they try to transfer him making it difficult for them to do so  She sent him to the emergency department to ensure that there is no medical reason for this change  She also expresses concern that it is becoming increasingly difficult for the patient to be cared for at his group home  They have an appointment scheduled with the patient's PCP who has filled out medical necessity forms for him to be placed in long-term care  This process is currently underway  She does tell me that they are able to meet his basic needs where he resides currently  Denies recent falls  Prior to Admission Medications   Prescriptions Last Dose Informant Patient Reported? Taking?    Butenafine HCl (LOTRIMIN ULTRA) 1 % cream  Outside Facility (Specify) Yes No   Sig: Apply topically   Calcium Carbonate-Vitamin D 600-400 MG-UNIT per chew tablet  Outside Facility (Specify) Yes No   Sig: Chew 1 tablet 2 (two) times a day   Miconazole Nitrate 2 % AERP  Outside Facility (Specify) Yes No   Sig: Apply topically   PROLIA 60 MG/ML  Outside Facility (Specify) Yes No   Starch-Maltodextrin (DIAFOODS THICK-IT PO)  Outside Facility (Specify) Yes No   Sig: Take by mouth   acetaminophen (TYLENOL) 325 mg tablet  Outside Facility (Specify) Yes No   Sig: Take 325 mg by mouth every 6 (six) hours as needed for mild pain   calcium polycarbophil (FIBERCON) 625 mg tablet  Outside Facility (Specify) Yes No   Sig: Take 625 mg by mouth daily   capsicum (ZOSTRIX) 0 075 % topical cream   No No   Sig: Apply topically 3 (three) times a day   carbidopa-levodopa (SINEMET)  mg per tablet   Yes No   cetirizine (ZyrTEC) 10 mg tablet  Outside Facility (Specify) Yes No   Sig: Take 10 mg by mouth daily   citalopram (CeleXA) 20 mg tablet  Outside Facility (Specify) Yes No   Sig: Take 20 mg by mouth daily     guaiFENesin (ROBITUSSIN) 100 MG/5ML oral liquid  Outside Facility (Specify) Yes No   Sig: Take 200 mg by mouth 3 (three) times a day as needed for cough   loperamide (IMODIUM A-D) 2 MG tablet  Outside Facility (Specify) Yes No   Sig: Take by mouth 4 (four) times a day as needed for diarrhea     meloxicam (MOBIC) 7 5 mg tablet   Yes No   Sig: Take 7 5 mg by mouth daily    oxybutynin (DITROPAN) 5 mg tablet  Outside Facility (Specify) Yes No   Sig: Take 5 mg by mouth 2 (two) times a day   risperiDONE (RisperDAL) 1 mg tablet  Outside Facility (Specify) Yes No   Sig: Take 0 5 mg by mouth 2 (two) times a day     terazosin (HYTRIN) 5 mg capsule  Outside Facility (Specify) Yes No   Sig: Take 5 mg by mouth daily at bedtime   tobramycin (TOBREX) 0 3 % SOLN   No No   Sig: Administer 2 drops to the right eye every 4 (four) hours while awake      Facility-Administered Medications: None       Past Medical History:   Diagnosis Date    Developmental non-verbal disorder     Dysphagia     moderate pharyngeal dysphagia    Gait disturbance     Hearing loss     Intellectual disability     Osteoarthritis     Osteoporosis     Prostate atrophy        No past surgical history on file     No family history on file  I have reviewed and agree with the history as documented  E-Cigarette/Vaping     E-Cigarette/Vaping Substances     Social History     Tobacco Use    Smoking status: Never Smoker    Smokeless tobacco: Never Used   Substance Use Topics    Alcohol use: No    Drug use: No       Review of Systems   Unable to perform ROS: Patient nonverbal       Physical Exam  Physical Exam  Constitutional:       General: He is not in acute distress  Appearance: He is well-developed  He is not diaphoretic  HENT:      Head: Normocephalic and atraumatic  Right Ear: External ear normal       Left Ear: External ear normal       Nose: Nose normal    Eyes:      Conjunctiva/sclera: Conjunctivae normal       Pupils: Pupils are equal, round, and reactive to light  Neck:      Musculoskeletal: Normal range of motion and neck supple  Cardiovascular:      Rate and Rhythm: Normal rate and regular rhythm  Heart sounds: Normal heart sounds  No murmur  No friction rub  No gallop  Pulmonary:      Effort: Pulmonary effort is normal  No respiratory distress  Breath sounds: Normal breath sounds  No wheezing or rales  Abdominal:      General: Bowel sounds are normal  There is no distension  Palpations: Abdomen is soft  Tenderness: There is no abdominal tenderness  There is no guarding  Musculoskeletal: Normal range of motion  General: No swelling, tenderness or deformity  Comments: All joints of the upper and lower extremities were ranged without pain  No swelling  No bruising  No apparent pain with palpation over the C, T, or L-spine  Skin:     General: Skin is warm and dry  Neurological:      Mental Status: He is alert  Motor: No abnormal muscle tone  Comments: Minimally verbal per his baseline  Face symmetric  5/5 strength in the bilateral upper and lower extremities  Intermittently follows some basic commands as per his baseline    When assisted by 2 medical providers patient bear some weight on his bilateral lower extremities and attempts to take 1 or 2 steps           Vital Signs  ED Triage Vitals   Temperature Pulse Respirations Blood Pressure SpO2   04/07/21 1523 04/07/21 1520 04/07/21 1520 04/07/21 1520 04/07/21 1520   98 1 °F (36 7 °C) 68 20 104/71 92 %      Temp src Heart Rate Source Patient Position - Orthostatic VS BP Location FiO2 (%)   -- 04/07/21 1520 04/07/21 1520 04/07/21 1520 --    Monitor Sitting Right arm       Pain Score       --                  Vitals:    04/07/21 1520   BP: 104/71   Pulse: 68   Patient Position - Orthostatic VS: Sitting         Visual Acuity      ED Medications  Medications - No data to display    Diagnostic Studies  Results Reviewed     Procedure Component Value Units Date/Time    Urine Macroscopic, POC [[id number]] Collected: 04/07/21 1836    Lab Status: Final result Specimen: Urine Updated: 04/07/21 1837     Color, UA Yellow     Clarity, UA Clear     pH, UA 6 0     Leukocytes, UA Negative     Nitrite, UA Negative     Protein, UA Negative mg/dl      Glucose, UA Negative mg/dl      Ketones, UA Negative mg/dl      Urobilinogen, UA 0 2 E U /dl      Bilirubin, UA Negative     Blood, UA Negative     Specific Gravity, UA >=1 030    Narrative:      CLINITEK RESULT    Comprehensive metabolic panel [[id number]]  (Abnormal) Collected: 04/07/21 1718    Lab Status: Final result Specimen: Blood from Arm, Left Updated: 04/07/21 1744     Sodium 142 mmol/L      Potassium 4 6 mmol/L      Chloride 105 mmol/L      CO2 31 mmol/L      ANION GAP 6 mmol/L      BUN 24 mg/dL      Creatinine 0 98 mg/dL      Glucose 97 mg/dL      Calcium 8 7 mg/dL      Corrected Calcium 9 7 mg/dL      AST 14 U/L      ALT 12 U/L      Alkaline Phosphatase 108 U/L      Total Protein 7 1 g/dL      Albumin 2 8 g/dL      Total Bilirubin 0 25 mg/dL      eGFR 78 ml/min/1 73sq m     Narrative:      [place] guidelines for Chronic Kidney Disease (CKD):     Stage 1 with normal or high GFR (GFR > 90 mL/min/1 73 square meters)    Stage 2 Mild CKD (GFR = 60-89 mL/min/1 73 square meters)    Stage 3A Moderate CKD (GFR = 45-59 mL/min/1 73 square meters)    Stage 3B Moderate CKD (GFR = 30-44 mL/min/1 73 square meters)    Stage 4 Severe CKD (GFR = 15-29 mL/min/1 73 square meters)    Stage 5 End Stage CKD (GFR <15 mL/min/1 73 square meters)  Note: GFR calculation is accurate only with a steady state creatinine    CBC and differential [[id number]]  (Abnormal) Collected: 04/07/21 1718    Lab Status: Final result Specimen: Blood from Arm, Left Updated: 04/07/21 1734     WBC 7 38 Thousand/uL      RBC 4 60 Million/uL      Hemoglobin 13 8 g/dL      Hematocrit 42 5 %      MCV 92 fL      MCH 30 0 pg      MCHC 32 5 g/dL      RDW 12 4 %      MPV 8 4 fL      Platelets 371 Thousands/uL      nRBC 0 /100 WBCs      Neutrophils Relative 56 %      Immat GRANS % 1 %      Lymphocytes Relative 27 %      Monocytes Relative 10 %      Eosinophils Relative 5 %      Basophils Relative 1 %      Neutrophils Absolute 4 21 Thousands/µL      Immature Grans Absolute 0 04 Thousand/uL      Lymphocytes Absolute 1 99 Thousands/µL      Monocytes Absolute 0 75 Thousand/µL      Eosinophils Absolute 0 33 Thousand/µL      Basophils Absolute 0 06 Thousands/µL                  No orders to display              Procedures  Procedures         ED Course                                           MDM  Number of Diagnoses or Management Options  Change in behavior: new and requires workup  Diagnosis management comments: Nontoxic  Afebrile and hemodynamically stable  Patient has a nonfocal neurologic exam as above  He does put some weight on both feet when he is assisted to stand by multiple medical person now  He does not stand on his or ambulate at baseline  Basic labs are unremarkable and there is no signs of urinary tract infection  His exam is not consistent with stroke    He has no pain with ranging of all the joints of his body and is alert and interactive per his baseline  I discussed with the nurse at the patient's group home that I have a very low suspicion for acute process as the cause of his lack of cooperation with transfers  This may be behavioral or may be progression of his chronic medical conditions  They are pursuing placement for the patient in a long-term care facility but do tell me that they are able to meet his basic needs where he lives currently  He appears clean and well cared for in the ED  He is stable for discharge with return precautions discussed  Amount and/or Complexity of Data Reviewed  Clinical lab tests: reviewed  Obtain history from someone other than the patient: yes  Review and summarize past medical records: yes    Patient Progress  Patient progress: stable           Disposition  Final diagnoses:   Change in behavior     Time reflects when diagnosis was documented in both MDM as applicable and the Disposition within this note     Time User Action Codes Description Comment    4/7/2021  7:11 PM Clearnce Sever Add [R53 1] Generalized weakness     4/7/2021  7:12 PM Clearnce Sever Remove [R53 1] Generalized weakness     4/7/2021  7:12 PM Clearnce Sever Add [R46 89] Change in behavior       ED Disposition     ED Disposition Condition Date/Time Comment    Discharge Stable Wed Apr 7, 2021  7:11 PM Kleber Jerome discharge to home/self care  Follow-up Information     Follow up With Specialties Details Why Contact Info Additional Information    Gibran Monte MD Family Medicine  Please follow-up with William's primary care doctor for further management of his chronic medical problems   [address] [facility] Emergency Department Emergency Medicine  Return to the Emergency Department for pain, fever, marked change in behavior, weakness to one side of the body, or for new or concerning symptoms   [address] [facility] [address] [facility] Emergency Department, [address], [place], [place], [address]          Discharge Medication List as of 4/7/2021  7:13 PM      CONTINUE these medications which have NOT CHANGED    Details   acetaminophen (TYLENOL) 325 mg tablet Take 325 mg by mouth every 6 (six) hours as needed for mild pain, Historical Med      Butenafine HCl (LOTRIMIN ULTRA) 1 % cream Apply topically, Historical Med      Calcium Carbonate-Vitamin D 600-400 MG-UNIT per chew tablet Chew 1 tablet 2 (two) times a day, Historical Med      calcium polycarbophil (FIBERCON) 625 mg tablet Take 625 mg by mouth daily, Historical Med      capsicum (ZOSTRIX) 0 075 % topical cream Apply topically 3 (three) times a day, Starting Wed 4/1/2020, Print      carbidopa-levodopa (SINEMET)  mg per tablet Starting Wed 11/18/2020, Historical Med      cetirizine (ZyrTEC) 10 mg tablet Take 10 mg by mouth daily, Historical Med      citalopram (CeleXA) 20 mg tablet Take 20 mg by mouth daily  , Historical Med      guaiFENesin (ROBITUSSIN) 100 MG/5ML oral liquid Take 200 mg by mouth 3 (three) times a day as needed for cough, Historical Med      loperamide (IMODIUM A-D) 2 MG tablet Take by mouth 4 (four) times a day as needed for diarrhea  , Historical Med      meloxicam (MOBIC) 7 5 mg tablet Take 7 5 mg by mouth daily , Starting Thu 1/3/2019, Historical Med      Miconazole Nitrate 2 % AERP Apply topically, Historical Med      oxybutynin (DITROPAN) 5 mg tablet Take 5 mg by mouth 2 (two) times a day, Historical Med      PROLIA 60 MG/ML Starting Tue 7/24/2018, Historical Med      risperiDONE (RisperDAL) 1 mg tablet Take 0 5 mg by mouth 2 (two) times a day  , Historical Med      Starch-Maltodextrin (DIAFOODS THICK-IT PO) Take by mouth, Historical Med      terazosin (HYTRIN) 5 mg capsule Take 5 mg by mouth daily at bedtime, Historical Med tobramycin (TOBREX) 0 3 % SOLN Administer 2 drops to the right eye every 4 (four) hours while awake, Starting Fri 11/23/2018, Normal           No discharge procedures on file      PDMP Review       Value Time User    PDMP Reviewed  Yes 5/17/2020  7:00 AM Gilberto Leyden, MD          ED Provider  Electronically Signed by           Chris Wise MD  04/08/21 9882

## 2021-04-16 ENCOUNTER — REMOTE DEVICE CLINIC VISIT (OUTPATIENT)
Dept: CARDIOLOGY CLINIC | Facility: CLINIC | Age: 71
End: 2021-04-16
Payer: COMMERCIAL

## 2021-04-16 DIAGNOSIS — Z95.0 CARDIAC PACEMAKER IN SITU: Primary | ICD-10-CM

## 2021-04-16 PROCEDURE — 93294 REM INTERROG EVL PM/LDLS PM: CPT | Performed by: INTERNAL MEDICINE

## 2021-04-16 PROCEDURE — 93296 REM INTERROG EVL PM/IDS: CPT | Performed by: INTERNAL MEDICINE

## 2021-04-16 NOTE — PROGRESS NOTES
Results for orders placed or performed in visit on 04/16/21   Cardiac EP device report    Narrative    MDT-DUAL CHAMBER PPM/ACTIVE SYSTEM IS MRI CONDITIONAL  CARELINK TRANSMISSION: BATTERY STATUS "9 4 YRS"  AP 5%  100%  ALL AVAILABLE LEAD PARAMETERS WITHIN NORMAL LIMITS  2 AT/AF NOTED; 0% BURDEN  NORMAL DEVICE FUNCTION   NC       Current Outpatient Medications:     acetaminophen (TYLENOL) 325 mg tablet, Take 325 mg by mouth every 6 (six) hours as needed for mild pain, Disp: , Rfl:     Butenafine HCl (LOTRIMIN ULTRA) 1 % cream, Apply topically, Disp: , Rfl:     Calcium Carbonate-Vitamin D 600-400 MG-UNIT per chew tablet, Chew 1 tablet 2 (two) times a day, Disp: , Rfl:     calcium polycarbophil (FIBERCON) 625 mg tablet, Take 625 mg by mouth daily, Disp: , Rfl:     capsicum (ZOSTRIX) 0 075 % topical cream, Apply topically 3 (three) times a day, Disp: 28 3 g, Rfl: 0    carbidopa-levodopa (SINEMET)  mg per tablet, , Disp: , Rfl:     cetirizine (ZyrTEC) 10 mg tablet, Take 10 mg by mouth daily, Disp: , Rfl:     citalopram (CeleXA) 20 mg tablet, Take 20 mg by mouth daily  , Disp: , Rfl:     guaiFENesin (ROBITUSSIN) 100 MG/5ML oral liquid, Take 200 mg by mouth 3 (three) times a day as needed for cough, Disp: , Rfl:     loperamide (IMODIUM A-D) 2 MG tablet, Take by mouth 4 (four) times a day as needed for diarrhea  , Disp: , Rfl:     meloxicam (MOBIC) 7 5 mg tablet, Take 7 5 mg by mouth daily , Disp: , Rfl:     Miconazole Nitrate 2 % AERP, Apply topically, Disp: , Rfl:     oxybutynin (DITROPAN) 5 mg tablet, Take 5 mg by mouth 2 (two) times a day, Disp: , Rfl:     PROLIA 60 MG/ML, , Disp: , Rfl:     risperiDONE (RisperDAL) 1 mg tablet, Take 0 5 mg by mouth 2 (two) times a day  , Disp: , Rfl:     Starch-Maltodextrin (DIAFOODS THICK-IT PO), Take by mouth, Disp: , Rfl:     terazosin (HYTRIN) 5 mg capsule, Take 5 mg by mouth daily at bedtime, Disp: , Rfl:     tobramycin (TOBREX) 0 3 % SOLN, Administer 2 drops to the right eye every 4 (four) hours while awake, Disp: 5 mL, Rfl: 0

## 2021-05-26 ENCOUNTER — TRANSCRIBE ORDERS (OUTPATIENT)
Dept: LAB | Facility: CLINIC | Age: 71
End: 2021-05-26

## 2021-05-26 DIAGNOSIS — F73 PROFOUND INTELLECTUAL DISABILITIES: ICD-10-CM

## 2021-05-26 DIAGNOSIS — Z79.899 ENCOUNTER FOR LONG-TERM (CURRENT) USE OF OTHER MEDICATIONS: ICD-10-CM

## 2021-05-26 DIAGNOSIS — F72 SEVERE INTELLECTUAL DISABILITIES: ICD-10-CM

## 2021-05-26 DIAGNOSIS — F31.62 MODERATE MIXED BIPOLAR I DISORDER (HCC): Primary | ICD-10-CM

## 2021-06-07 ENCOUNTER — OFFICE VISIT (OUTPATIENT)
Dept: AUDIOLOGY | Age: 71
End: 2021-06-07
Payer: COMMERCIAL

## 2021-06-07 DIAGNOSIS — H90.3 SENSORY HEARING LOSS, BILATERAL: Primary | ICD-10-CM

## 2021-06-07 NOTE — PROGRESS NOTES
Progress Note    Name:  Charlotte Jerome  :  1950  Age:  80 y o  Date of Evaluation: 21     Caregiver dropped off hearing aid -  [employer]o SP4 hearing aid  s/n [id number]  Warranty expires 2020  Tone hook was broken  - Replaced tone hook - Re-tubed earmold  Listening check revealed good sound quality   Caregiver paid $30 00      Christin Jean Baptiste , CCC-A  Clinical Audiologist

## 2021-06-09 ENCOUNTER — HOSPITAL ENCOUNTER (EMERGENCY)
Facility: HOSPITAL | Age: 71
Discharge: HOME | End: 2021-06-09
Attending: EMERGENCY MEDICINE
Payer: COMMERCIAL

## 2021-06-09 VITALS
TEMPERATURE: 98.4 F | RESPIRATION RATE: 18 BRPM | HEART RATE: 77 BPM | DIASTOLIC BLOOD PRESSURE: 70 MMHG | HEIGHT: 60 IN | OXYGEN SATURATION: 98 % | SYSTOLIC BLOOD PRESSURE: 113 MMHG | BODY MASS INDEX: 23.24 KG/M2

## 2021-06-09 DIAGNOSIS — R09.89 CHOKING EPISODE: Primary | ICD-10-CM

## 2021-06-09 PROCEDURE — 99281 EMR DPT VST MAYX REQ PHY/QHP: CPT | Performed by: EMERGENCY MEDICINE

## 2021-06-09 PROCEDURE — 99283 EMERGENCY DEPT VISIT LOW MDM: CPT

## 2021-06-09 NOTE — ED PROVIDER NOTES
History  Chief Complaint   Patient presents with    Choking     Pt brought to ER from [address] [place] [address] for well check s/p choking on a cheerio approx 20 minutes PTA  Group Home staff admitted to performing the heimlich procedure and "got the 1 cheerio out"  Pt acting normal self per staff     81-year-old male with a developmental delay who is nonverbal presents today from a group home for evaluation after a choking episode just prior to arrival   Group home staff says that he was choking on cereal they performed a Heimlich maneuver and got 1 Cheerios up  Patient in no acute distress  Staff says they brought him in for evaluation per protocol  History provided by:  Caregiver  History limited by:  Patient nonverbal      Prior to Admission Medications   Prescriptions Last Dose Informant Patient Reported? Taking?    Butenafine HCl (LOTRIMIN ULTRA) 1 % cream  Outside Facility (Specify) Yes No   Sig: Apply topically   Calcium Carbonate-Vitamin D 600-400 MG-UNIT per chew tablet  Outside Facility (Specify) Yes No   Sig: Chew 1 tablet 2 (two) times a day   Miconazole Nitrate 2 % AERP  Outside Facility (Specify) Yes No   Sig: Apply topically   PROLIA 60 MG/ML  Outside Facility (Specify) Yes No   Starch-Maltodextrin (DIAFOODS THICK-IT PO)  Outside Facility (Specify) Yes No   Sig: Take by mouth   acetaminophen (TYLENOL) 325 mg tablet  Outside Facility (Specify) Yes No   Sig: Take 325 mg by mouth every 6 (six) hours as needed for mild pain   calcium polycarbophil (FIBERCON) 625 mg tablet  Outside Facility (Specify) Yes No   Sig: Take 625 mg by mouth daily   capsicum (ZOSTRIX) 0 075 % topical cream   No No   Sig: Apply topically 3 (three) times a day   carbidopa-levodopa (SINEMET)  mg per tablet   Yes No   cetirizine (ZyrTEC) 10 mg tablet  Outside Facility (Specify) Yes No   Sig: Take 10 mg by mouth daily   citalopram (CeleXA) 20 mg tablet  Outside Facility (Specify) Yes No   Sig: Take 20 mg by mouth daily     guaiFENesin (ROBITUSSIN) 100 MG/5ML oral liquid  Outside Facility (Specify) Yes No   Sig: Take 200 mg by mouth 3 (three) times a day as needed for cough   loperamide (IMODIUM A-D) 2 MG tablet  Outside Facility (Specify) Yes No   Sig: Take by mouth 4 (four) times a day as needed for diarrhea     meloxicam (MOBIC) 7 5 mg tablet   Yes No   Sig: Take 7 5 mg by mouth daily    oxybutynin (DITROPAN) 5 mg tablet  Outside Facility (Specify) Yes No   Sig: Take 5 mg by mouth 2 (two) times a day   risperiDONE (RisperDAL) 1 mg tablet  Outside Facility (Specify) Yes No   Sig: Take 0 5 mg by mouth 2 (two) times a day     terazosin (HYTRIN) 5 mg capsule  Outside Facility (Specify) Yes No   Sig: Take 5 mg by mouth daily at bedtime   tobramycin (TOBREX) 0 3 % SOLN   No No   Sig: Administer 2 drops to the right eye every 4 (four) hours while awake      Facility-Administered Medications: None       Past Medical History:   Diagnosis Date    Developmental non-verbal disorder     Dysphagia     moderate pharyngeal dysphagia    Gait disturbance     Hearing loss     Intellectual disability     Osteoarthritis     Osteoporosis     Prostate atrophy        History reviewed  No pertinent surgical history  History reviewed  No pertinent family history  I have reviewed and agree with the history as documented  E-Cigarette/Vaping     E-Cigarette/Vaping Substances     Social History     Tobacco Use    Smoking status: Never Smoker    Smokeless tobacco: Never Used   Substance Use Topics    Alcohol use: No    Drug use: No       Review of Systems   Unable to perform ROS: Patient nonverbal       Physical Exam  Physical Exam  Vitals signs and nursing note reviewed  Constitutional:       Appearance: He is well-developed  Comments: Developmental delay   HENT:      Head: Normocephalic and atraumatic  Mouth/Throat:      Mouth: Mucous membranes are moist       Pharynx: Uvula midline  Tonsils: No tonsillar exudate     Eyes:      Pupils: Pupils are equal, round, and reactive to light  Neck:      Musculoskeletal: Normal range of motion and neck supple  Cardiovascular:      Rate and Rhythm: Normal rate and regular rhythm  Pulmonary:      Effort: Pulmonary effort is normal       Breath sounds: Normal breath sounds  Abdominal:      General: Bowel sounds are normal       Palpations: Abdomen is soft  Tenderness: There is no abdominal tenderness  There is no guarding or rebound  Musculoskeletal:         General: No tenderness or deformity  Skin:     General: Skin is warm and dry  Capillary Refill: Capillary refill takes less than 2 seconds  Neurological:      General: No focal deficit present  Mental Status: He is alert  Comments: Patient moving all extremities equally, no focal neuro deficits noted    At baseline per group home staff     Psychiatric:         Mood and Affect: Mood normal          Behavior: Behavior normal          Vital Signs  ED Triage Vitals [06/09/21 0805]   Temperature Pulse Respirations Blood Pressure SpO2   98 4 °F (36 9 °C) 77 18 113/70 98 %      Temp Source Heart Rate Source Patient Position - Orthostatic VS BP Location FiO2 (%)   Oral Monitor Sitting Left arm --      Pain Score       --           Vitals:    06/09/21 0805   BP: 113/70   Pulse: 77   Patient Position - Orthostatic VS: Sitting         Visual Acuity      ED Medications  Medications - No data to display    Diagnostic Studies  Results Reviewed     None                 No orders to display              Procedures  Procedures         ED Course                                           MDM  Number of Diagnoses or Management Options  Choking episode: minor  Risk of Complications, Morbidity, and/or Mortality  Presenting problems: minimal  Diagnostic procedures: minimal  Management options: minimal    Patient Progress  Patient progress: stable      Disposition  Final diagnoses:   Choking episode     Time reflects when diagnosis was documented in both MDM as applicable and the Disposition within this note     Time User Action Codes Description Comment    6/9/2021  8:49 AM Irene Turner Stacia Add [R09 89] Choking episode       ED Disposition     ED Disposition Condition Date/Time Comment    Discharge Stable Wed Jun 9, 2021  8:49 AM Beryl Jerome discharge to home/self care  Follow-up Information     Follow up With Specialties Details Why Contact Info    Remy Stone MD Family Medicine Schedule an appointment as soon as possible for a visit   [address] [facility]  [address] [facility] [address] [place] [address]  [phone]            Patient's Medications   Discharge Prescriptions    No medications on file     No discharge procedures on file      PDMP Review       Value Time User    PDMP Reviewed  Yes 5/17/2020  7:00 AM Boo Arce MD          ED Provider  Electronically Signed by           Eliecer Villalta DO  06/09/21 2485

## 2021-07-14 ENCOUNTER — OFFICE VISIT (OUTPATIENT)
Dept: GASTROENTEROLOGY | Facility: CLINIC | Age: 71
End: 2021-07-14
Payer: COMMERCIAL

## 2021-07-14 VITALS
SYSTOLIC BLOOD PRESSURE: 97 MMHG | DIASTOLIC BLOOD PRESSURE: 70 MMHG | HEART RATE: 81 BPM | HEIGHT: 61 IN | BODY MASS INDEX: 22.48 KG/M2

## 2021-07-14 DIAGNOSIS — Z86.010 HX OF ADENOMATOUS POLYP OF COLON: Primary | ICD-10-CM

## 2021-07-14 PROCEDURE — 99213 OFFICE O/P EST LOW 20 MIN: CPT | Performed by: INTERNAL MEDICINE

## 2021-07-14 NOTE — PROGRESS NOTES
[facility] - Outpatient Follow-up Note  Carolynn Lamb Deltito 80 y o  male MRN: [id number]  Encounter: [id number]          ASSESSMENT AND PLAN:      1  Hx of adenomatous polyp of colon   repeat colonoscopy in 2025    ______________________________________________________________________    SUBJECTIVE:   Patient underwent surveillance colonoscopy in November 2020 with single adenoma and several hyperplastic polyps  Has been having no issues according to his caretaker  Was recently seen in the emergency room for an episode of possible choking  Reportedly has been having no obvious dysphagia and has had several recent swallowing evaluations without concern raised      REVIEW OF SYSTEMS:    Review of Systems   Unable to perform ROS: patient nonverbal          Historical Information   Past Medical History:   Diagnosis Date    Developmental non-verbal disorder     Dysphagia     moderate pharyngeal dysphagia    Gait disturbance     Hearing loss     Intellectual disability     Osteoarthritis     Osteoporosis     Prostate atrophy      History reviewed  No pertinent surgical history    Social History   Social History     Substance and Sexual Activity   Alcohol Use No     Social History     Substance and Sexual Activity   Drug Use No     Social History     Tobacco Use   Smoking Status Never Smoker   Smokeless Tobacco Never Used     Family History   Family history unknown: Yes       Meds/Allergies       Current Outpatient Medications:     acetaminophen (TYLENOL) 325 mg tablet    Butenafine HCl (LOTRIMIN ULTRA) 1 % cream    Calcium Carbonate-Vitamin D 600-400 MG-UNIT per chew tablet    calcium polycarbophil (FIBERCON) 625 mg tablet    carbidopa-levodopa (SINEMET)  mg per tablet    citalopram (CeleXA) 20 mg tablet    guaiFENesin (ROBITUSSIN) 100 MG/5ML oral liquid    loperamide (IMODIUM A-D) 2 MG tablet    PROLIA 60 MG/ML    risperiDONE (RisperDAL) 1 mg tablet   Starch-Maltodextrin (DIAFOODS THICK-IT PO)    terazosin (HYTRIN) 5 mg capsule    capsicum (ZOSTRIX) 0 075 % topical cream    cetirizine (ZyrTEC) 10 mg tablet    meloxicam (MOBIC) 7 5 mg tablet    Miconazole Nitrate 2 % AERP    oxybutynin (DITROPAN) 5 mg tablet    tobramycin (TOBREX) 0 3 % SOLN    No Known Allergies        Objective     Blood pressure 97/70, pulse 81, height 5' 1" (1 549 m)  Body mass index is 22 48 kg/m²  PHYSICAL EXAM:      Physical Exam  Vitals and nursing note reviewed  Constitutional:       General: He is not in acute distress  HENT:      Head: Normocephalic and atraumatic  Mouth/Throat:      Mouth: Mucous membranes are moist    Eyes:      General: No scleral icterus  Pupils: Pupils are equal, round, and reactive to light  Cardiovascular:      Rate and Rhythm: Normal rate and regular rhythm  Pulmonary:      Effort: Pulmonary effort is normal  No respiratory distress  Abdominal:      General: There is no distension  Palpations: Abdomen is soft  Musculoskeletal:         General: Normal range of motion  Cervical back: Normal range of motion and neck supple  Skin:     General: Skin is warm and dry  Neurological:      Mental Status: He is alert  Lab Results:   No visits with results within 1 Day(s) from this visit     Latest known visit with results is:   Admission on 04/07/2021, Discharged on 04/07/2021   Component Date Value    WBC 04/07/2021 7 38     RBC 04/07/2021 4 60     Hemoglobin 04/07/2021 13 8     Hematocrit 04/07/2021 42 5     MCV 04/07/2021 92     MCH 04/07/2021 30 0     MCHC 04/07/2021 32 5     RDW 04/07/2021 12 4     MPV 04/07/2021 8 4*    Platelets 17/14/6301 373     nRBC 04/07/2021 0     Neutrophils Relative 04/07/2021 56     Immat GRANS % 04/07/2021 1     Lymphocytes Relative 04/07/2021 27     Monocytes Relative 04/07/2021 10     Eosinophils Relative 04/07/2021 5     Basophils Relative 04/07/2021 1     Neutrophils Absolute 04/07/2021 4 21     Immature Grans Absolute 04/07/2021 0 04     Lymphocytes Absolute 04/07/2021 1 99     Monocytes Absolute 04/07/2021 0 75     Eosinophils Absolute 04/07/2021 0 33     Basophils Absolute 04/07/2021 0 06     Sodium 04/07/2021 142     Potassium 04/07/2021 4 6     Chloride 04/07/2021 105     CO2 04/07/2021 31     ANION GAP 04/07/2021 6     BUN 04/07/2021 24     Creatinine 04/07/2021 0 98     Glucose 04/07/2021 97     Calcium 04/07/2021 8 7     Corrected Calcium 04/07/2021 9 7     AST 04/07/2021 14     ALT 04/07/2021 12     Alkaline Phosphatase 04/07/2021 108     Total Protein 04/07/2021 7 1     Albumin 04/07/2021 2 8*    Total Bilirubin 04/07/2021 0 25     eGFR 04/07/2021 78     Color, UA 04/07/2021 Yellow     Clarity, UA 04/07/2021 Clear     pH, UA 04/07/2021 6 0     Leukocytes, UA 04/07/2021 Negative     Nitrite, UA 04/07/2021 Negative     Protein, UA 04/07/2021 Negative     Glucose, UA 04/07/2021 Negative     Ketones, UA 04/07/2021 Negative     Urobilinogen, UA 04/07/2021 0 2     Bilirubin, UA 04/07/2021 Negative     Blood, UA 04/07/2021 Negative     Specific Gravity, UA 04/07/2021 >=1 030          Radiology Results:   No results found

## 2021-07-16 ENCOUNTER — REMOTE DEVICE CLINIC VISIT (OUTPATIENT)
Dept: CARDIOLOGY CLINIC | Facility: CLINIC | Age: 71
End: 2021-07-16
Payer: COMMERCIAL

## 2021-07-16 DIAGNOSIS — Z95.0 CARDIAC PACEMAKER IN SITU: Primary | ICD-10-CM

## 2021-07-16 PROCEDURE — 93296 REM INTERROG EVL PM/IDS: CPT | Performed by: INTERNAL MEDICINE

## 2021-07-16 PROCEDURE — 93294 REM INTERROG EVL PM/LDLS PM: CPT | Performed by: INTERNAL MEDICINE

## 2021-07-16 NOTE — PROGRESS NOTES
Results for orders placed or performed in visit on 07/16/21   Cardiac EP device report    Narrative    MDT-DUAL CHAMBER PPM/ACTIVE SYSTEM IS MRI CONDITIONAL  CARELINK TRANSMISSION: BATTERY STATUS "9 YRS " AP 5%  100%  ALL AVAILABLE LEAD PARAMETERS WITHIN NORMAL LIMITS  NO SIGNIFICANT HIGH RATE EPISODES  NORMAL DEVICE FUNCTION   NC         Current Outpatient Medications:     acetaminophen (TYLENOL) 325 mg tablet, Take 325 mg by mouth every 6 (six) hours as needed for mild pain, Disp: , Rfl:     Butenafine HCl (LOTRIMIN ULTRA) 1 % cream, Apply topically, Disp: , Rfl:     Calcium Carbonate-Vitamin D 600-400 MG-UNIT per chew tablet, Chew 1 tablet 2 (two) times a day, Disp: , Rfl:     calcium polycarbophil (FIBERCON) 625 mg tablet, Take 625 mg by mouth daily, Disp: , Rfl:     capsicum (ZOSTRIX) 0 075 % topical cream, Apply topically 3 (three) times a day, Disp: 28 3 g, Rfl: 0    carbidopa-levodopa (SINEMET)  mg per tablet, , Disp: , Rfl:     cetirizine (ZyrTEC) 10 mg tablet, Take 10 mg by mouth daily, Disp: , Rfl:     citalopram (CeleXA) 20 mg tablet, Take 20 mg by mouth daily  , Disp: , Rfl:     guaiFENesin (ROBITUSSIN) 100 MG/5ML oral liquid, Take 200 mg by mouth 3 (three) times a day as needed for cough, Disp: , Rfl:     loperamide (IMODIUM A-D) 2 MG tablet, Take by mouth 4 (four) times a day as needed for diarrhea  , Disp: , Rfl:     meloxicam (MOBIC) 7 5 mg tablet, Take 7 5 mg by mouth daily  (Patient not taking: Reported on 7/14/2021), Disp: , Rfl:     Miconazole Nitrate 2 % AERP, Apply topically, Disp: , Rfl:     oxybutynin (DITROPAN) 5 mg tablet, Take 5 mg by mouth 2 (two) times a day (Patient not taking: Reported on 7/14/2021), Disp: , Rfl:     PROLIA 60 MG/ML, , Disp: , Rfl:     risperiDONE (RisperDAL) 1 mg tablet, Take 0 5 mg by mouth 2 (two) times a day  , Disp: , Rfl:     Starch-Maltodextrin (DIAFOODS THICK-IT PO), Take by mouth, Disp: , Rfl:     terazosin (HYTRIN) 5 mg capsule, Take 5 mg by mouth daily at bedtime, Disp: , Rfl:     tobramycin (TOBREX) 0 3 % SOLN, Administer 2 drops to the right eye every 4 (four) hours while awake, Disp: 5 mL, Rfl: 0

## 2021-09-27 ENCOUNTER — HOSPITAL ENCOUNTER (EMERGENCY)
Facility: HOSPITAL | Age: 71
Discharge: HOME | End: 2021-09-27
Attending: EMERGENCY MEDICINE | Admitting: EMERGENCY MEDICINE
Payer: COMMERCIAL

## 2021-09-27 VITALS
OXYGEN SATURATION: 97 % | HEART RATE: 92 BPM | RESPIRATION RATE: 18 BRPM | TEMPERATURE: 98.3 F | SYSTOLIC BLOOD PRESSURE: 108 MMHG | DIASTOLIC BLOOD PRESSURE: 71 MMHG

## 2021-09-27 DIAGNOSIS — Z71.1 WORRIED WELL: ICD-10-CM
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
## 2021-09-27 PROCEDURE — 99283 EMERGENCY DEPT VISIT LOW MDM: CPT

## 2021-09-27 PROCEDURE — 99284 EMERGENCY DEPT VISIT MOD MDM: CPT | Performed by: EMERGENCY MEDICINE

## 2021-09-30 ENCOUNTER — IN-CLINIC DEVICE VISIT (OUTPATIENT)
Dept: CARDIOLOGY CLINIC | Facility: CLINIC | Age: 71
End: 2021-09-30
Payer: COMMERCIAL

## 2021-09-30 DIAGNOSIS — Z95.0 CARDIAC PACEMAKER IN SITU: Primary | ICD-10-CM

## 2021-09-30 PROCEDURE — 93280 PM DEVICE PROGR EVAL DUAL: CPT | Performed by: INTERNAL MEDICINE

## 2021-09-30 NOTE — PROGRESS NOTES
Results for orders placed or performed in visit on 09/30/21   Cardiac EP device report    Narrative    MDT-DUAL CHAMBER PPM/ACTIVE SYSTEM IS MRI CONDITIONAL  DEVICE INTERROGATED IN THE [place] OFFICE: BATTERY VOLTAGE ADEQUATE-9 YRS  AP 4%  100%  ALL AVAILABLE LEAD PARAMETERS WITHIN NORMAL LIMITS  NO NEW SIGNIFICANT HIGH RATE EPISODES  NO PROGRAMMING CHANGES MADE TO DEVICE PARAMETERS  NORMAL DEVICE FUNCTION   NC         Current Outpatient Medications:     acetaminophen (TYLENOL) 325 mg tablet, Take 325 mg by mouth every 6 (six) hours as needed for mild pain, Disp: , Rfl:     Butenafine HCl (LOTRIMIN ULTRA) 1 % cream, Apply topically, Disp: , Rfl:     Calcium Carbonate-Vitamin D 600-400 MG-UNIT per chew tablet, Chew 1 tablet 2 (two) times a day, Disp: , Rfl:     calcium polycarbophil (FIBERCON) 625 mg tablet, Take 625 mg by mouth daily, Disp: , Rfl:     capsicum (ZOSTRIX) 0 075 % topical cream, Apply topically 3 (three) times a day, Disp: 28 3 g, Rfl: 0    carbidopa-levodopa (SINEMET)  mg per tablet, , Disp: , Rfl:     cetirizine (ZyrTEC) 10 mg tablet, Take 10 mg by mouth daily, Disp: , Rfl:     citalopram (CeleXA) 20 mg tablet, Take 20 mg by mouth daily  , Disp: , Rfl:     guaiFENesin (ROBITUSSIN) 100 MG/5ML oral liquid, Take 200 mg by mouth 3 (three) times a day as needed for cough, Disp: , Rfl:     loperamide (IMODIUM A-D) 2 MG tablet, Take by mouth 4 (four) times a day as needed for diarrhea  , Disp: , Rfl:     meloxicam (MOBIC) 7 5 mg tablet, Take 7 5 mg by mouth daily  (Patient not taking: Reported on 7/14/2021), Disp: , Rfl:     Miconazole Nitrate 2 % AERP, Apply topically, Disp: , Rfl:     oxybutynin (DITROPAN) 5 mg tablet, Take 5 mg by mouth 2 (two) times a day (Patient not taking: Reported on 7/14/2021), Disp: , Rfl:     PROLIA 60 MG/ML, , Disp: , Rfl:     risperiDONE (RisperDAL) 1 mg tablet, Take 0 5 mg by mouth 2 (two) times a day  , Disp: , Rfl:     Starch-Maltodextrin (DIAFOODS THICK-IT PO), Take by mouth, Disp: , Rfl:     terazosin (HYTRIN) 5 mg capsule, Take 5 mg by mouth daily at bedtime, Disp: , Rfl:     tobramycin (TOBREX) 0 3 % SOLN, Administer 2 drops to the right eye every 4 (four) hours while awake, Disp: 5 mL, Rfl: 0

## 2022-01-03 ENCOUNTER — REMOTE DEVICE CLINIC VISIT (OUTPATIENT)
Dept: CARDIOLOGY CLINIC | Facility: CLINIC | Age: 72
End: 2022-01-03
Payer: MEDICARE

## 2022-01-03 DIAGNOSIS — Z95.0 PRESENCE OF CARDIAC PACEMAKER: Primary | ICD-10-CM

## 2022-01-03 PROCEDURE — 93296 REM INTERROG EVL PM/IDS: CPT | Performed by: INTERNAL MEDICINE

## 2022-01-03 PROCEDURE — 93294 REM INTERROG EVL PM/LDLS PM: CPT | Performed by: INTERNAL MEDICINE

## 2022-01-03 NOTE — PROGRESS NOTES
Results for orders placed or performed in visit on 01/03/22   Cardiac EP device report    Narrative    MDT-DUAL CHAMBER PPM/ACTIVE SYSTEM IS MRI CONDITIONAL  CARELINK TRANSMISSION: BATTERY VOLTAGE ADEQUATE (8 8 YRS)  AP: 5 2%  : 100% (>40%~CHB)  ALL AVAILABLE LEAD PARAMETERS WITHIN NORMAL LIMITS  1 VT MONITORED EPISODE W/ EGM SHOWING NSVT 10 BEATS @ 185 BPM  PT DOES NOT TAKES ANY BB  EF: 60% (ECHO 1/19/18)  PACEMAKER FUNCTIONING APPROPRIATELY    [address] [place] [address]

## 2022-01-05 ENCOUNTER — TELEPHONE (OUTPATIENT)
Dept: OTHER | Facility: OTHER | Age: 72
End: 2022-01-05

## 2022-01-05 ENCOUNTER — OFFICE VISIT (OUTPATIENT)
Dept: UROLOGY | Facility: CLINIC | Age: 72
End: 2022-01-05
Payer: MEDICARE

## 2022-01-05 VITALS — HEIGHT: 61 IN | BODY MASS INDEX: 22.48 KG/M2 | SYSTOLIC BLOOD PRESSURE: 112 MMHG | DIASTOLIC BLOOD PRESSURE: 70 MMHG

## 2022-01-05 DIAGNOSIS — N40.1 BENIGN PROSTATIC HYPERPLASIA WITH LOWER URINARY TRACT SYMPTOMS, SYMPTOM DETAILS UNSPECIFIED: ICD-10-CM

## 2022-01-05 DIAGNOSIS — R32 URINARY INCONTINENCE, UNSPECIFIED TYPE: Primary | ICD-10-CM

## 2022-01-05 DIAGNOSIS — Z12.5 SCREENING FOR PROSTATE CANCER: ICD-10-CM

## 2022-01-05 PROCEDURE — 99203 OFFICE O/P NEW LOW 30 MIN: CPT | Performed by: PHYSICIAN ASSISTANT

## 2022-01-05 RX ORDER — FINASTERIDE 5 MG/1
5 TABLET, FILM COATED ORAL DAILY
Qty: 90 TABLET | Refills: 2 | Status: SHIPPED | OUTPATIENT
Start: 2022-01-05

## 2022-01-05 RX ORDER — NITROFURANTOIN 25; 75 MG/1; MG/1
100 CAPSULE ORAL 2 TIMES DAILY
Qty: 10 CAPSULE | Refills: 0 | Status: SHIPPED | OUTPATIENT
Start: 2022-01-05 | End: 2022-01-10

## 2022-01-05 NOTE — PROGRESS NOTES
1  Urinary incontinence, unspecified type         Assessment and plan:       1  Urinary incontinence  -patient already on terazosin daily  -as it is difficult to obtain a urine specimen from the patient's since he has incontinent, a short course of antibiotics for presumed urinary infection  - will start patient on finasteride as well to facilitate urinary drainage  - follow-up for re-evaluation thereafter      Darin Esparza PA-C      Chief Complaint     Urinary incontinence    History of Present Illness     Abhilash Sanchez is a 80 y o  male presenting today as a new patient for urinary incontinence  Patient is present with the staff from his residential facility  Patient is unable to provide a majority of his history  Patient's caregiver helps contribute to his history that he was having increasing urinary incontinence  Patient does not indicate symptoms of pain with urination  They have not noticed urinary odor or hematuria  Patient has been afebrile and at baseline mental cognition  Patient on terazosin 5mg PO QD, and oxybutynin 5mg PO BID  Medical comorbidities include third degree heart block, MR  Laboratory     Lab Results   Component Value Date    CREATININE 0 98 04/07/2021       Review of Systems     Review of Systems   Unable to perform ROS: Patient nonverbal       Allergies     No Known Allergies    Physical Exam     Physical Exam  Constitutional:       General: He is not in acute distress  Appearance: Normal appearance  He is normal weight  He is not ill-appearing, toxic-appearing or diaphoretic  HENT:      Head: Normocephalic and atraumatic  Eyes:      General:         Right eye: No discharge  Left eye: No discharge  Conjunctiva/sclera: Conjunctivae normal    Pulmonary:      Effort: Pulmonary effort is normal  No respiratory distress  Abdominal:      General: There is no distension  Palpations: Abdomen is soft  There is no mass        Tenderness: There is no abdominal tenderness  Hernia: No hernia is present  Musculoskeletal:      Comments: Slightly contracted  Utilizing wheelchair assistance   Skin:     General: Skin is warm and dry  Coloration: Skin is not jaundiced or pale  Neurological:      Mental Status: He is alert  Vital Signs     There were no vitals filed for this visit        Current Medications       Current Outpatient Medications:     acetaminophen (TYLENOL) 325 mg tablet, Take 325 mg by mouth every 6 (six) hours as needed for mild pain, Disp: , Rfl:     Butenafine HCl (LOTRIMIN ULTRA) 1 % cream, Apply topically, Disp: , Rfl:     Calcium Carbonate-Vitamin D 600-400 MG-UNIT per chew tablet, Chew 1 tablet 2 (two) times a day, Disp: , Rfl:     calcium polycarbophil (FIBERCON) 625 mg tablet, Take 625 mg by mouth daily, Disp: , Rfl:     capsicum (ZOSTRIX) 0 075 % topical cream, Apply topically 3 (three) times a day, Disp: 28 3 g, Rfl: 0    carbidopa-levodopa (SINEMET)  mg per tablet, , Disp: , Rfl:     cetirizine (ZyrTEC) 10 mg tablet, Take 10 mg by mouth daily, Disp: , Rfl:     citalopram (CeleXA) 20 mg tablet, Take 20 mg by mouth daily  , Disp: , Rfl:     guaiFENesin (ROBITUSSIN) 100 MG/5ML oral liquid, Take 200 mg by mouth 3 (three) times a day as needed for cough, Disp: , Rfl:     loperamide (IMODIUM A-D) 2 MG tablet, Take by mouth 4 (four) times a day as needed for diarrhea  , Disp: , Rfl:     meloxicam (MOBIC) 7 5 mg tablet, Take 7 5 mg by mouth daily  (Patient not taking: Reported on 7/14/2021), Disp: , Rfl:     Miconazole Nitrate 2 % AERP, Apply topically, Disp: , Rfl:     oxybutynin (DITROPAN) 5 mg tablet, Take 5 mg by mouth 2 (two) times a day (Patient not taking: Reported on 7/14/2021), Disp: , Rfl:     PROLIA 60 MG/ML, , Disp: , Rfl:     risperiDONE (RisperDAL) 1 mg tablet, Take 0 5 mg by mouth 2 (two) times a day  , Disp: , Rfl:     Starch-Maltodextrin (DIAFOODS THICK-IT PO), Take by mouth, Disp: , Rfl:     terazosin (HYTRIN) 5 mg capsule, Take 5 mg by mouth daily at bedtime, Disp: , Rfl:     tobramycin (TOBREX) 0 3 % SOLN, Administer 2 drops to the right eye every 4 (four) hours while awake, Disp: 5 mL, Rfl: 0      Active Problems     Patient Active Problem List   Diagnosis    Third degree heart block ([address] )    MR (mental retardation)    Chest wall contusion    Overactive bladder    Cardiac pacemaker in situ         Past Medical History     Past Medical History:   Diagnosis Date    Developmental non-verbal disorder     Dysphagia     moderate pharyngeal dysphagia    Gait disturbance     Hearing loss     Intellectual disability     Osteoarthritis     Osteoporosis     Prostate atrophy          Surgical History     Past Surgical History:   Procedure Laterality Date    US GUIDED INTRAOPERATIVE  2/24/2017         Family History     Family History   Family history unknown: Yes         Social History     Social History       Radiology

## 2022-01-05 NOTE — TELEPHONE ENCOUNTER
FYI- pharmacist called  Pt's med were not electronically sent and pt was calling for order  Verbal was given so Rxs could be filled

## 2022-01-10 ENCOUNTER — HOSPITAL ENCOUNTER (OUTPATIENT)
Dept: RADIOLOGY | Facility: HOSPITAL | Age: 72
Discharge: HOME | End: 2022-01-10
Payer: MEDICARE

## 2022-01-10 DIAGNOSIS — R05.1 ACUTE COUGH: ICD-10-CM

## 2022-01-10 PROCEDURE — U0003 INFECTIOUS AGENT DETECTION BY NUCLEIC ACID (DNA OR RNA); SEVERE ACUTE RESPIRATORY SYNDROME CORONAVIRUS 2 (SARS-COV-2) (CORONAVIRUS DISEASE [COVID-19]), AMPLIFIED PROBE TECHNIQUE, MAKING USE OF HIGH THROUGHPUT TECHNOLOGIES AS DESCRIBED BY CMS-2020-01-R: HCPCS | Performed by: FAMILY MEDICINE

## 2022-01-10 PROCEDURE — U0005 INFEC AGEN DETEC AMPLI PROBE: HCPCS | Performed by: FAMILY MEDICINE

## 2022-01-10 PROCEDURE — 71046 X-RAY EXAM CHEST 2 VIEWS: CPT

## 2022-01-28 ENCOUNTER — HOSPITAL ENCOUNTER (OUTPATIENT)
Dept: RADIOLOGY | Facility: HOSPITAL | Age: 72
Discharge: HOME | End: 2022-01-28
Payer: MEDICARE

## 2022-01-28 DIAGNOSIS — R63.4 ABNORMAL WEIGHT LOSS: ICD-10-CM

## 2022-01-28 PROCEDURE — 71046 X-RAY EXAM CHEST 2 VIEWS: CPT

## 2022-02-08 ENCOUNTER — HOSPITAL ENCOUNTER (OUTPATIENT)
Dept: ULTRASOUND IMAGING | Facility: HOSPITAL | Age: 72
Discharge: HOME | End: 2022-02-08
Payer: MEDICARE

## 2022-02-08 DIAGNOSIS — R63.4 LOSS OF WEIGHT: ICD-10-CM

## 2022-02-08 PROCEDURE — 76857 US EXAM PELVIC LIMITED: CPT

## 2022-02-08 PROCEDURE — 76700 US EXAM ABDOM COMPLETE: CPT

## 2022-03-07 ENCOUNTER — TELEPHONE (OUTPATIENT)
Dept: PSYCHIATRY | Facility: CLINIC | Age: 72
End: 2022-03-07

## 2022-03-07 NOTE — TELEPHONE ENCOUNTER
PT  was calling to set up np appointment, inform her of the wait list and when asked what pt was looking for, she stated pt is non-verbal  Nurse said another one her clients see a dr here who is also non-verbal  Pt is not added to wait list at this time

## 2022-04-04 ENCOUNTER — TELEPHONE (OUTPATIENT)
Dept: UROLOGY | Facility: AMBULATORY SURGERY CENTER | Age: 72
End: 2022-04-04

## 2022-04-04 NOTE — TELEPHONE ENCOUNTER
[facility] for Melina Seals called today at 9:00 a m  to verify the next appointment for Melina   I spoke to Pari to inform him that Melina Seals is scheduled to see Velma Smith PA-C 7/8/2022 in [facility] location  Questions resolved

## 2022-04-05 ENCOUNTER — REMOTE DEVICE CLINIC VISIT (OUTPATIENT)
Dept: CARDIOLOGY CLINIC | Facility: CLINIC | Age: 72
End: 2022-04-05
Payer: MEDICARE

## 2022-04-05 DIAGNOSIS — Z95.0 PRESENCE OF PERMANENT CARDIAC PACEMAKER: Primary | ICD-10-CM

## 2022-04-05 PROCEDURE — 93296 REM INTERROG EVL PM/IDS: CPT | Performed by: INTERNAL MEDICINE

## 2022-04-05 PROCEDURE — 93294 REM INTERROG EVL PM/LDLS PM: CPT | Performed by: INTERNAL MEDICINE

## 2022-04-05 NOTE — PROGRESS NOTES
MDT-DUAL CHAMBER PPM/ACTIVE SYSTEM IS MRI CONDITIONAL   CARELINK TRANSMISSION:  BATTERY VOLTAGE ADEQUATE (8 5 YR)    AP 10 2%  100% (>40%/AVB/DDD 60 PPM, 150-180 MS)   ALL LEAD PARAMETERS WITHIN NORMAL LIMITS   NO SIGNIFICANT HIGH RATE EPISODES   NORMAL DEVICE FUNCTION   RG

## 2022-05-03 NOTE — PATIENT INSTRUCTIONS
See copy of group home form filed in chart for instructions Spoke to patient at [phone], patient rescheduled to 6/24 at 1130am covid test 6/22 Jewell mahajan letter through [facility] Portal online    Sent case message

## 2022-06-13 ENCOUNTER — OFFICE VISIT (OUTPATIENT)
Dept: AUDIOLOGY | Age: 72
End: 2022-06-13

## 2022-06-13 DIAGNOSIS — H90.3 SENSORY HEARING LOSS, BILATERAL: Primary | ICD-10-CM

## 2022-06-13 NOTE — PROGRESS NOTES
Progress Note    Name:  Cony Jerome  :  1950  Age:  71 y o  Date of Evaluation: 22     Cora Wang was scheduled for a hearing test  Both ears occluded  Recommended an ear cleaning prior to testing  Caregiver reported Cora Wang is no longer wearing his hearing aids as they are broken  Recommended that they contact [employer] for an in network provider for Cora Wang to obtain new hearing aids (patient has [employer] benefit)      Christin Cedillo   Clinical Audiologist

## 2022-06-15 NOTE — PROGRESS NOTES
Cardiology Follow Up    Mela Dakin Deltito  1950  [id number]  [facility] [address]  [phone]    1  Presence of permanent cardiac pacemaker         Interval History:  Patient here for follow-up visit  Patient has history of CHB with placement of [employer] PPM January 2018  Pacer interrogation done April 2022 demonstrated an appropriately functioning device with no significant high rate episodes noted  Echocardiogram done January 2018 demonstrated preserved LV systolic function with LVEF of 60%  There was no significant valvular heart disease  Patient is well  He has no chest pain or significant dyspnea  Patient has intellectual disability  He is here today with a caretaker      Patient Active Problem List   Diagnosis    Third degree heart block ([address] )    MR (mental retardation)    Chest wall contusion    Overactive bladder    Cardiac pacemaker in situ     Past Medical History:   Diagnosis Date    Developmental non-verbal disorder     Dysphagia     moderate pharyngeal dysphagia    Gait disturbance     Hearing loss     Intellectual disability     Osteoarthritis     Osteoporosis     Prostate atrophy      Social History     Socioeconomic History    Marital status: Single     Spouse name: Not on file    Number of children: Not on file    Years of education: Not on file    Highest education level: Not on file   Occupational History    Not on file   Tobacco Use    Smoking status: Never Smoker    Smokeless tobacco: Never Used   Substance and Sexual Activity    Alcohol use: No    Drug use: No    Sexual activity: Not on file   Other Topics Concern    Not on file   Social History Narrative    Not on file     Social Determinants of Health     Financial Resource Strain: Not on file   Food Insecurity: Not on file   Transportation Needs: Not on file   Physical Activity: Not on file   Stress: Not on file   Social Connections: Not on file   Intimate Partner Violence: Not on file   Housing Stability: Not on file      Family History   Family history unknown: Yes     Past Surgical History:   Procedure Laterality Date    US GUIDED INTRAOPERATIVE  2/24/2017       Current Outpatient Medications:     acetaminophen (TYLENOL) 325 mg tablet, Take 325 mg by mouth every 6 (six) hours as needed for mild pain, Disp: , Rfl:     Butenafine HCl 1 % cream, Apply topically, Disp: , Rfl:     Calcium Carbonate-Vitamin D 600-400 MG-UNIT per chew tablet, Chew 1 tablet 2 (two) times a day, Disp: , Rfl:     calcium polycarbophil (FIBERCON) 625 mg tablet, Take 625 mg by mouth daily, Disp: , Rfl:     carbidopa-levodopa (SINEMET)  mg per tablet, , Disp: , Rfl:     citalopram (CeleXA) 20 mg tablet, Take 20 mg by mouth daily  , Disp: , Rfl:     finasteride (PROSCAR) 5 mg tablet, Take 1 tablet (5 mg total) by mouth daily, Disp: 90 tablet, Rfl: 2    guaiFENesin (ROBITUSSIN) 100 MG/5ML oral liquid, Take 200 mg by mouth 3 (three) times a day as needed for cough, Disp: , Rfl:     loperamide (IMODIUM A-D) 2 MG tablet, Take by mouth 4 (four) times a day as needed for diarrhea  , Disp: , Rfl:     oxybutynin (DITROPAN) 5 mg tablet, Take 5 mg by mouth 2 (two) times a day  , Disp: , Rfl:     PROLIA 60 MG/ML, , Disp: , Rfl:     risperiDONE (RisperDAL) 1 mg tablet, Take 0 5 mg by mouth 2 (two) times a day  , Disp: , Rfl:     Starch-Maltodextrin (DIAFOODS THICK-IT PO), Take by mouth, Disp: , Rfl:     terazosin (HYTRIN) 5 mg capsule, Take 5 mg by mouth daily at bedtime, Disp: , Rfl:     Calcium + Vitamin D3 600-10 MG-MCG TABS, , Disp: , Rfl:     meloxicam (MOBIC) 7 5 mg tablet, Take 7 5 mg by mouth daily  (Patient not taking: No sig reported), Disp: , Rfl:   No Known Allergies    Labs:not applicable  Imaging: No results found      Review of Systems:  Review of Systems   All other systems reviewed and are negative  Physical Exam:  /66 (BP Location: Right arm, Patient Position: Sitting, Cuff Size: Child)   Pulse 87   SpO2 97%   Physical Exam  Vitals reviewed  Constitutional:       Appearance: He is well-developed  HENT:      Head: Normocephalic and atraumatic  Eyes:      Conjunctiva/sclera: Conjunctivae normal       Pupils: Pupils are equal, round, and reactive to light  Cardiovascular:      Rate and Rhythm: Normal rate  Heart sounds: Normal heart sounds  Pulmonary:      Effort: Pulmonary effort is normal       Breath sounds: Normal breath sounds  Musculoskeletal:      Cervical back: Normal range of motion and neck supple  Skin:     General: Skin is warm and dry  Neurological:      Mental Status: He is alert and oriented to person, place, and time  Discussion/Summary:I will continue the patient's present medical regimen  Patient appears well compensated  I have asked the patient to call if there is a problem in the interim otherwise I will see the patient in 2 years time  He will have ongoing evaluation of his permanent pacemaker

## 2022-06-24 ENCOUNTER — OFFICE VISIT (OUTPATIENT)
Dept: CARDIOLOGY CLINIC | Facility: CLINIC | Age: 72
End: 2022-06-24
Payer: MEDICARE

## 2022-06-24 VITALS — DIASTOLIC BLOOD PRESSURE: 66 MMHG | HEART RATE: 87 BPM | OXYGEN SATURATION: 97 % | SYSTOLIC BLOOD PRESSURE: 115 MMHG

## 2022-06-24 DIAGNOSIS — Z95.0 PRESENCE OF PERMANENT CARDIAC PACEMAKER: Primary | ICD-10-CM

## 2022-06-24 PROCEDURE — 99214 OFFICE O/P EST MOD 30 MIN: CPT | Performed by: INTERNAL MEDICINE

## 2022-06-24 RX ORDER — CALCIUM CARBONATE/VITAMIN D3 600 MG-10
TABLET ORAL
COMMUNITY
Start: 2022-06-07

## 2022-06-24 NOTE — PATIENT INSTRUCTIONS
I will continue the patient's present medical regimen  Patient appears well compensated  I have asked the patient to call if there is a problem in the interim otherwise I will see the patient in 2 years time

## 2022-07-06 ENCOUNTER — REMOTE DEVICE CLINIC VISIT (OUTPATIENT)
Dept: CARDIOLOGY CLINIC | Facility: CLINIC | Age: 72
End: 2022-07-06
Payer: MEDICARE

## 2022-07-06 DIAGNOSIS — Z95.0 CARDIAC PACEMAKER IN SITU: Primary | ICD-10-CM

## 2022-07-06 PROCEDURE — 93296 REM INTERROG EVL PM/IDS: CPT | Performed by: INTERNAL MEDICINE

## 2022-07-06 PROCEDURE — 93294 REM INTERROG EVL PM/LDLS PM: CPT | Performed by: INTERNAL MEDICINE

## 2022-07-07 ENCOUNTER — TELEPHONE (OUTPATIENT)
Dept: UROLOGY | Facility: AMBULATORY SURGERY CENTER | Age: 72
End: 2022-07-07

## 2022-07-07 NOTE — TELEPHONE ENCOUNTER
S/w caregiver, advised her of pt's appt tomorrow with Sarah Moy (7/8/2022)  She stated she was unaware of any recent tests done but would contact pt's doctor to check and would f/u

## 2022-07-08 ENCOUNTER — OFFICE VISIT (OUTPATIENT)
Dept: UROLOGY | Facility: CLINIC | Age: 72
End: 2022-07-08
Payer: MEDICARE

## 2022-07-08 VITALS — HEART RATE: 64 BPM | DIASTOLIC BLOOD PRESSURE: 62 MMHG | SYSTOLIC BLOOD PRESSURE: 98 MMHG | OXYGEN SATURATION: 93 %

## 2022-07-08 DIAGNOSIS — R32 URINARY INCONTINENCE, UNSPECIFIED TYPE: ICD-10-CM

## 2022-07-08 DIAGNOSIS — N40.1 BENIGN PROSTATIC HYPERPLASIA WITH LOWER URINARY TRACT SYMPTOMS, SYMPTOM DETAILS UNSPECIFIED: Primary | ICD-10-CM

## 2022-07-08 LAB — POST-VOID RESIDUAL VOLUME, ML POC: 25 ML

## 2022-07-08 PROCEDURE — 51798 US URINE CAPACITY MEASURE: CPT | Performed by: PHYSICIAN ASSISTANT

## 2022-07-08 PROCEDURE — 99213 OFFICE O/P EST LOW 20 MIN: CPT | Performed by: PHYSICIAN ASSISTANT

## 2022-07-08 RX ORDER — OFLOXACIN 3 MG/ML
SOLUTION/ DROPS OPHTHALMIC
COMMUNITY
Start: 2022-06-07

## 2022-07-08 RX ORDER — PRENATAL VIT 91/IRON/FOLIC/DHA 28-975-200
COMBINATION PACKAGE (EA) ORAL
COMMUNITY

## 2022-07-08 NOTE — PROGRESS NOTES
1  Benign prostatic hyperplasia with lower urinary tract symptoms, symptom details unspecified  POCT Measure PVR   2  Urinary incontinence, unspecified type  POCT Measure PVR       Assessment and plan:       1  Urinary incontinence  - PVR normal  - continue terazosin and finasteride  - f/u PRN    Samir Brandon PA-C      Chief Complaint     Urinary incontinence    History of Present Illness     Jose Jerome is a 71 y o  male presenting today   for urinary incontinence  Patient is present with the staff from his residential facility  Patient is unable to provide a majority of his history  Patient's caregiver helps contribute to his history that he was having increasing urinary incontinence  Patient does not indicate symptoms of pain with urination  They have not noticed urinary odor or hematuria  Patient has been afebrile and at baseline mental cognition  Patient on terazosin 5mg PO QD, and oxybutynin 5mg PO BID  Medical comorbidities include third degree heart block, MR      PVR 25mL    Laboratory     Lab Results   Component Value Date    CREATININE 0 98 04/07/2021       Review of Systems     Review of Systems   Unable to perform ROS: Patient nonverbal   Constitutional: Negative for activity change, appetite change, chills, diaphoresis, fatigue, fever and unexpected weight change  Respiratory: Negative for chest tightness and shortness of breath  Cardiovascular: Negative for chest pain, palpitations and leg swelling  Gastrointestinal: Negative for abdominal distention, abdominal pain, constipation, diarrhea, nausea and vomiting  Genitourinary: Negative for decreased urine volume, difficulty urinating, dysuria, enuresis, flank pain, frequency, genital sores, hematuria and urgency  Musculoskeletal: Negative for back pain, gait problem and myalgias  Skin: Negative for color change, pallor, rash and wound  Psychiatric/Behavioral: Negative for behavioral problems   The patient is not nervous/anxious  Allergies     No Known Allergies    Physical Exam     Physical Exam  Constitutional:       General: He is not in acute distress  Appearance: Normal appearance  He is normal weight  He is not ill-appearing, toxic-appearing or diaphoretic  HENT:      Head: Normocephalic and atraumatic  Eyes:      General:         Right eye: No discharge  Left eye: No discharge  Conjunctiva/sclera: Conjunctivae normal    Pulmonary:      Effort: Pulmonary effort is normal  No respiratory distress  Abdominal:      General: There is no distension  Palpations: Abdomen is soft  There is no mass  Tenderness: There is no abdominal tenderness  Hernia: No hernia is present  Musculoskeletal:      Comments: Slightly contracted  Utilizing wheelchair assistance   Skin:     General: Skin is warm and dry  Coloration: Skin is not jaundiced or pale  Neurological:      Mental Status: He is alert             Vital Signs     Vitals:    07/08/22 0948   BP: 98/62   BP Location: Left arm   Patient Position: Sitting   Cuff Size: Adult   Pulse: 64   SpO2: 93%         Current Medications       Current Outpatient Medications:     acetaminophen (TYLENOL) 325 mg tablet, Take 325 mg by mouth every 6 (six) hours as needed for mild pain, Disp: , Rfl:     Butenafine HCl 1 % cream, Apply topically, Disp: , Rfl:     Calcium + Vitamin D3 600-10 MG-MCG TABS, , Disp: , Rfl:     Calcium Carbonate-Vitamin D 600-400 MG-UNIT per chew tablet, Chew 1 tablet 2 (two) times a day, Disp: , Rfl:     calcium polycarbophil (FIBERCON) 625 mg tablet, Take 625 mg by mouth daily, Disp: , Rfl:     carbidopa-levodopa (SINEMET)  mg per tablet, , Disp: , Rfl:     citalopram (CeleXA) 20 mg tablet, Take 20 mg by mouth daily  , Disp: , Rfl:     finasteride (PROSCAR) 5 mg tablet, Take 1 tablet (5 mg total) by mouth daily, Disp: 90 tablet, Rfl: 2    guaiFENesin (ROBITUSSIN) 100 MG/5ML oral liquid, Take 200 mg by mouth 3 (three) times a day as needed for cough, Disp: , Rfl:     Nutritional Supplements (ENSURE NUTRA SHAKE HI-CATHERINE PO), Take by mouth, Disp: , Rfl:     ofloxacin (OCUFLOX) 0 3 % ophthalmic solution, , Disp: , Rfl:     PROLIA 60 MG/ML, , Disp: , Rfl:     risperiDONE (RisperDAL) 1 mg tablet, Take 0 5 mg by mouth 2 (two) times a day  , Disp: , Rfl:     Starch-Maltodextrin (DIAFOODS THICK-IT PO), Take by mouth, Disp: , Rfl:     terazosin (HYTRIN) 5 mg capsule, Take 5 mg by mouth daily at bedtime, Disp: , Rfl:     terbinafine (LamISIL) 1 % cream, terbinafine HCl 1 % topical cream, Disp: , Rfl:     loperamide (IMODIUM A-D) 2 MG tablet, Take by mouth 4 (four) times a day as needed for diarrhea   (Patient not taking: Reported on 7/8/2022), Disp: , Rfl:     meloxicam (MOBIC) 7 5 mg tablet, Take 7 5 mg by mouth daily  (Patient not taking: No sig reported), Disp: , Rfl:     oxybutynin (DITROPAN) 5 mg tablet, Take 5 mg by mouth 2 (two) times a day   (Patient not taking: Reported on 7/8/2022), Disp: , Rfl:       Active Problems     Patient Active Problem List   Diagnosis    Third degree heart block ([address] )    MR (mental retardation)    Chest wall contusion    Overactive bladder    Cardiac pacemaker in situ         Past Medical History     Past Medical History:   Diagnosis Date    Developmental non-verbal disorder     Dysphagia     moderate pharyngeal dysphagia    Gait disturbance     Hearing loss     Intellectual disability     Osteoarthritis     Osteoporosis     Prostate atrophy          Surgical History     Past Surgical History:   Procedure Laterality Date    US GUIDED INTRAOPERATIVE  2/24/2017         Family History     Family History   Family history unknown: Yes         Social History     Social History       Radiology

## 2022-08-24 ENCOUNTER — HOSPITAL ENCOUNTER (OUTPATIENT)
Dept: RADIOLOGY | Facility: HOSPITAL | Age: 72
Discharge: HOME | End: 2022-08-24

## 2022-08-24 DIAGNOSIS — R13.12 DYSPHAGIA, OROPHARYNGEAL PHASE: ICD-10-CM

## 2022-09-02 ENCOUNTER — HOSPITAL ENCOUNTER (OUTPATIENT)
Dept: RADIOLOGY | Facility: HOSPITAL | Age: 72
Discharge: HOME | End: 2022-09-02
Payer: MEDICARE

## 2022-09-02 PROCEDURE — 74230 X-RAY XM SWLNG FUNCJ C+: CPT

## 2022-09-02 PROCEDURE — 92611 MOTION FLUOROSCOPY/SWALLOW: CPT

## 2022-09-02 NOTE — PROCEDURES
Video Swallow Study      Patient Name: Aimee Pottsito  ARKGP'Y Date: 9/2/2022        Past Medical History  Past Medical History:   Diagnosis Date    Developmental non-verbal disorder     Dysphagia     moderate pharyngeal dysphagia    Gait disturbance     Hearing loss     Intellectual disability     Osteoarthritis     Osteoporosis     Prostate atrophy         Past Surgical History  Past Surgical History:   Procedure Laterality Date    US GUIDED INTRAOPERATIVE  2/24/2017         General Information:    70 yo gentleman referred to [facility]  for a VBS by CHRISTIN Cheney for dysphagia  Pt is a resident at [facility]; currently on puree w/ moderately thick liquids  Cognition:  Awake and alert  No verbalizations  Speech/Swallow Mech: Oral motor movements appeared  ; Dentition was  natural;  Respiratory Status: WFL on RA;   Current diet: puree w/ honey thick liquids    Prior VBS 3/19/21: Mild-moderate oral/pharyngeal dysphagia as described above  No penetration or aspiration observed w/ thick and thin liquids by straw; trace amt of silent aspiration noted w/ cup sips of thin liquids, however pt usually drinks from a straw per caretaker  Coughing was noted during study w/o evidence of penetration or aspiration at the time  Pt was seen in radiology for a Video Barium Swallow Study, seated in the upright position and viewed laterally with the following consistencies: puree, soft/solids, honey thick liquids, nectar thick liquids  Results are as follows:     **Images are available for review on PACS          Oral Stage: Moderately impaired    pt noted to keep head in extended position  pt had difficulty sucking from straw, decreased bolus control/quick transfers noted w/ cup sips of liquids,better w/ tsps of thick liquids  Pt w/ munching mastication of soft/solids; slow, decreased transfer  Excessive tongue pumping noted after transfer and swallows of bolus  Pharyngeal Stage: Mild-moderately   Swallow initiation was timely with complete laryngeal excursion and airway entrance closure but decreased epiglottic inversion with mild pharyngeal residue noted  Double swallows noted w/ each bolus  Trace penetration noted x1 with tsp of HTL, but no other episodes of penetration or aspiration observed during study  Pt was noted to have delayed, weak cough x2 during session, but did not appear related to swallowing  Esophageal Stage:   briefly assessed, no overt abnormality noted  Assessment Summary: Moderate Oral/ Mild-moderate pharyngeal dysphagia due to munching mastication, quick bolus transfer w/ cup sips of thick liquids and decreased epiglottic inversion  No penetration or aspiration observed on study, despite delayed weak coughing noted  Recommendations:   Cont Puree w/ honey thick liquids by tsp   aspiration precautions   meds crushed in puree   If desired/ warranted, can consider speech tx for possible diet upgrade       Jasmyn Sosa MA CCC-SLP  Speech Pathologist  [place] license # [id number]  [place] license # [id number]  Available via [employer]

## 2022-09-15 ENCOUNTER — HOSPITAL ENCOUNTER (OUTPATIENT)
Dept: RADIOLOGY | Age: 72
Discharge: HOME | End: 2022-09-15
Payer: MEDICARE

## 2022-09-15 DIAGNOSIS — M81.0 AGE-RELATED OSTEOPOROSIS WITHOUT CURRENT PATHOLOGICAL FRACTURE: ICD-10-CM

## 2022-09-15 PROCEDURE — 77080 DXA BONE DENSITY AXIAL: CPT

## 2022-10-24 NOTE — PROGRESS NOTES
Cardiology Follow Up    Ines Jerome  1950  [id number]  [address] [place] [id number] [phone]    1  Cardiac pacemaker in situ         Interval History: Patient here for follow-up visit  Patient has history of CHB with placement of [employer] PPM January 2018  Pacer interrogation done 7/2022 demonstrated an appropriately functioning device with one 10 beat run of NSVT noted     Echocardiogram done January 2018 demonstrated preserved LV systolic function with LVEF of 60%  There was no significant valvular heart disease  Patient has intellectual disability  He resides [facility]   He has no chest pain or dyspnea      Patient Active Problem List   Diagnosis   • Third degree heart block (HCC)   • MR (mental retardation)   • Chest wall contusion   • Overactive bladder   • Cardiac pacemaker in situ     Past Medical History:   Diagnosis Date   • Developmental non-verbal disorder    • Dysphagia     moderate pharyngeal dysphagia   • Gait disturbance    • Hearing loss    • Intellectual disability    • Osteoarthritis    • Osteoporosis    • Prostate atrophy      Social History     Socioeconomic History   • Marital status: Single     Spouse name: Not on file   • Number of children: Not on file   • Years of education: Not on file   • Highest education level: Not on file   Occupational History   • Not on file   Tobacco Use   • Smoking status: Never Smoker   • Smokeless tobacco: Never Used   Substance and Sexual Activity   • Alcohol use: No   • Drug use: No   • Sexual activity: Not Currently   Other Topics Concern   • Not on file   Social History Narrative   • Not on file     Social Determinants of Health     Financial Resource Strain: Not on file   Food Insecurity: Not on file   Transportation Needs: Not on file   Physical Activity: Not on file   Stress: Not on file   Social Connections: Not on file   Intimate Partner Violence: Not on file   Housing Stability: Not on file      Family History   Family history unknown: Yes     Past Surgical History:   Procedure Laterality Date   • US GUIDED INTRAOPERATIVE  2/24/2017       Current Outpatient Medications:   •  acetaminophen (TYLENOL) 325 mg tablet, Take 325 mg by mouth every 6 (six) hours as needed for mild pain, Disp: , Rfl:   •  Butenafine HCl 1 % cream, Apply topically, Disp: , Rfl:   •  Calcium + Vitamin D3 600-10 MG-MCG TABS, , Disp: , Rfl:   •  Calcium Carbonate-Vitamin D 600-400 MG-UNIT per chew tablet, Chew 1 tablet 2 (two) times a day, Disp: , Rfl:   •  calcium polycarbophil (FIBERCON) 625 mg tablet, Take 625 mg by mouth daily, Disp: , Rfl:   •  carbidopa-levodopa (SINEMET)  mg per tablet, , Disp: , Rfl:   •  citalopram (CeleXA) 20 mg tablet, Take 20 mg by mouth daily  , Disp: , Rfl:   •  finasteride (PROSCAR) 5 mg tablet, Take 1 tablet (5 mg total) by mouth daily, Disp: 90 tablet, Rfl: 2  •  guaiFENesin (ROBITUSSIN) 100 MG/5ML oral liquid, Take 200 mg by mouth 3 (three) times a day as needed for cough, Disp: , Rfl:   •  loperamide (IMODIUM A-D) 2 MG tablet, Take by mouth 4 (four) times a day as needed for diarrhea   (Patient not taking: Reported on 7/8/2022), Disp: , Rfl:   •  meloxicam (MOBIC) 7 5 mg tablet, Take 7 5 mg by mouth daily  (Patient not taking: No sig reported), Disp: , Rfl:   •  Nutritional Supplements (ENSURE NUTRA SHAKE HI-CATHERINE PO), Take by mouth, Disp: , Rfl:   •  ofloxacin (OCUFLOX) 0 3 % ophthalmic solution, , Disp: , Rfl:   •  oxybutynin (DITROPAN) 5 mg tablet, Take 5 mg by mouth 2 (two) times a day   (Patient not taking: Reported on 7/8/2022), Disp: , Rfl:   •  PROLIA 60 MG/ML, , Disp: , Rfl:   •  risperiDONE (RisperDAL) 1 mg tablet, Take 0 5 mg by mouth 2 (two) times a day  , Disp: , Rfl:   •  Starch-Maltodextrin (DIAFOODS THICK-IT PO), Take by mouth, Disp: , Rfl:   •  terazosin (HYTRIN) 5 mg capsule, Take 5 mg by mouth daily at bedtime, Disp: , Rfl:   •  terbinafine (LamISIL) 1 % cream, terbinafine HCl 1 % topical cream, Disp: , Rfl:   No Known Allergies    Labs:not applicable  Imaging: No results found  Review of Systems:  Review of Systems   All other systems reviewed and are negative  Physical Exam:  BP 96/60 (BP Location: Right arm, Cuff Size: Standard)   Pulse 72   Physical Exam  Vitals reviewed  Constitutional:       Appearance: He is underweight  HENT:      Head: Normocephalic and atraumatic  Eyes:      Conjunctiva/sclera: Conjunctivae normal       Pupils: Pupils are equal, round, and reactive to light  Cardiovascular:      Rate and Rhythm: Normal rate  Heart sounds: Normal heart sounds  Pulmonary:      Effort: Pulmonary effort is normal       Breath sounds: Normal breath sounds  Musculoskeletal:      Cervical back: Normal range of motion and neck supple  Comments: Flexion contractures   Skin:     General: Skin is warm and dry  Discussion/Summary:I will continue the patient's present medical regimen  Patient appears well compensated  I have asked the patient to call if there is a problem in the interim otherwise I will see the patient in one years time

## 2022-11-02 ENCOUNTER — IN-CLINIC DEVICE VISIT (OUTPATIENT)
Dept: CARDIOLOGY CLINIC | Facility: CLINIC | Age: 72
End: 2022-11-02

## 2022-11-02 ENCOUNTER — TELEPHONE (OUTPATIENT)
Dept: CARDIOLOGY CLINIC | Facility: CLINIC | Age: 72
End: 2022-11-02

## 2022-11-02 ENCOUNTER — OFFICE VISIT (OUTPATIENT)
Dept: CARDIOLOGY CLINIC | Facility: CLINIC | Age: 72
End: 2022-11-02

## 2022-11-02 VITALS — DIASTOLIC BLOOD PRESSURE: 60 MMHG | SYSTOLIC BLOOD PRESSURE: 96 MMHG | HEART RATE: 72 BPM

## 2022-11-02 DIAGNOSIS — Z95.0 CARDIAC PACEMAKER IN SITU: Primary | ICD-10-CM

## 2022-11-02 RX ORDER — NYSTATIN 100000 U/G
CREAM TOPICAL
COMMUNITY
Start: 2022-07-28

## 2022-11-02 RX ORDER — LANOLIN ALCOHOL/MO/W.PET/CERES
3 CREAM (GRAM) TOPICAL
COMMUNITY

## 2022-11-02 NOTE — PROGRESS NOTES
Results for orders placed or performed in visit on 11/02/22   Cardiac EP device report    Narrative    MDT-DUAL CHAMBER PPM/ACTIVE SYSTEM IS MRI CONDITIONAL  DEVICE INTERROGATED IN THE [facility] OFFICE  BATTERY VOLTAGE ADEQUATE (7 9 YRS)  AP-5%, -100% (DEPENDENT/CHB)  ALL LEAD PARAMETERS WITHIN NORMAL LIMITS  NO SIGNIFICANT HIGH RATE EPISODES  PT TO SEE DR Gary Esparza  NORMAL DEVICE FUNCTION   GV

## 2023-02-08 NOTE — ED NOTES
1. \"Have you been to the ER, urgent care clinic since your last visit? Hospitalized since your last visit? \" No    2. \"Have you seen or consulted any other health care providers outside of the [address] [place] [address] since your last visit? \" No     3. For patients aged 39-70: Has the patient had a colonoscopy / FIT/ Cologuard? NA - based on age      If the patient is female:    4. For patients aged 41-77: Has the patient had a mammogram within the past 2 years? NA - based on age or sex      11. For patients aged 21-65: Has the patient had a pap smear? No    Chief Complaint   Patient presents with    Establish Care     No concerns   Patient does have a concern, anxiety    Patient states when labs were done thyroids were elevated.     [facility] Pt remains in stretcher, acting appropriately  No new complaints or deficits        Rachel Duncan RN  03/24/19 3662

## 2023-02-09 ENCOUNTER — REMOTE DEVICE CLINIC VISIT (OUTPATIENT)
Dept: CARDIOLOGY CLINIC | Facility: CLINIC | Age: 73
End: 2023-02-09

## 2023-02-09 DIAGNOSIS — Z95.0 CARDIAC PACEMAKER IN SITU: Primary | ICD-10-CM

## 2023-02-09 NOTE — PROGRESS NOTES
Results for orders placed or performed in visit on 02/09/23   Cardiac EP device report    Narrative    MDT-DUAL CHAMBER PPM/ACTIVE SYSTEM IS MRI CONDITIONAL  [employer]EL[employer] TRANSMISSION: BATTERY STATUS "7 5 YRS " AP 0%  100%  ALL AVAILABLE LEAD PARAMETERS WITHIN NORMAL LIMITS  1 NSVT NOTED; APPROX 8 BEATS @ 158 BPM  EF 60% (ECHO 2018)  NORMAL DEVICE FUNCTION   NC

## 2023-05-11 ENCOUNTER — REMOTE DEVICE CLINIC VISIT (OUTPATIENT)
Dept: CARDIOLOGY CLINIC | Facility: CLINIC | Age: 73
End: 2023-05-11

## 2023-05-11 DIAGNOSIS — Z95.0 CARDIAC PACEMAKER IN SITU: Primary | ICD-10-CM

## 2023-05-11 NOTE — PROGRESS NOTES
Results for orders placed or performed in visit on 05/11/23   Cardiac EP device report    Narrative    MDT-DUAL CHAMBER PPM/ACTIVE SYSTEM IS MRI CONDITIONAL  CARELINK TRANSMISSION: BATTERY VOLTAGE ADEQUATE (7 2 YRS)  AP 1 1%   100% (CHB/DDD 60PPM); ALL AVAILABLE LEAD PARAMETERS WITHIN NORMAL LIMITS  NO SIGNIFICANT HIGH RATE EPISODES  NORMAL DEVICE FUNCTION    ES

## 2023-07-30 ENCOUNTER — APPOINTMENT (EMERGENCY)
Dept: CT IMAGING | Facility: HOSPITAL | Age: 73
End: 2023-07-30
Payer: MEDICARE

## 2023-07-30 ENCOUNTER — HOSPITAL ENCOUNTER (EMERGENCY)
Facility: HOSPITAL | Age: 73
Discharge: HOME | End: 2023-07-31
Attending: EMERGENCY MEDICINE | Admitting: EMERGENCY MEDICINE
Payer: MEDICARE

## 2023-07-30 VITALS
TEMPERATURE: 98.3 F | RESPIRATION RATE: 18 BRPM | OXYGEN SATURATION: 96 % | DIASTOLIC BLOOD PRESSURE: 68 MMHG | SYSTOLIC BLOOD PRESSURE: 117 MMHG | HEART RATE: 67 BPM

## 2023-07-30 DIAGNOSIS — S01.81XA FACIAL LACERATION, INITIAL ENCOUNTER: ICD-10-CM

## 2023-07-30 DIAGNOSIS — S09.90XA CLOSED HEAD INJURY, INITIAL ENCOUNTER: Primary | ICD-10-CM

## 2023-07-30 PROCEDURE — 99284 EMERGENCY DEPT VISIT MOD MDM: CPT

## 2023-07-30 PROCEDURE — 70486 CT MAXILLOFACIAL W/O DYE: CPT

## 2023-07-30 PROCEDURE — 72125 CT NECK SPINE W/O DYE: CPT

## 2023-07-30 PROCEDURE — 99284 EMERGENCY DEPT VISIT MOD MDM: CPT | Performed by: EMERGENCY MEDICINE

## 2023-07-30 PROCEDURE — 70450 CT HEAD/BRAIN W/O DYE: CPT

## 2023-07-31 NOTE — DISCHARGE INSTRUCTIONS
You were seen in the ED for facial trauma. You had cts showing no evidence for acute traumatic injury. You were diagnosed with a facial laceration. You have been discharged with skin glue. Please follow up with your primary care physician within the next 1 week for continued management of your conditions. Please come back to the ED if you develop uncontrollable pain, bleeding. Thank you very much for utilizing the ED this evening.

## 2023-07-31 NOTE — ED PROVIDER NOTES
History  Chief Complaint   Patient presents with   • Fall     Pt comes from [facility] via EMS. States he fell out of his wheel chair and hit his face. -LOC -thinners Pt acting at baseline. This is a 45-year-old male patient presenting to the ED for fall. He has a history of mental retardation. He is a resident of [facility]. Patient fell out of his wheelchair, hit his face. He did not lose consciousness. He is not on any anticoagulation or antiplatelet medications. Patient is unhelpful as far as history is concerned due to his MR. Prior to Admission Medications   Prescriptions Last Dose Informant Patient Reported? Taking?    Butenafine HCl 1 % cream  Outside Facility (Specify) Yes No   Sig: Apply topically   Calcium + Vitamin D3 600-10 MG-MCG TABS  Outside Facility (Specify) Yes No   Calcium Carbonate-Vitamin D 600-400 MG-UNIT per chew tablet  Outside Facility (Specify) Yes No   Sig: Chew 1 tablet 2 (two) times a day   Nutritional Supplements (ENSURE NUTRA SHAKE HI-CATHERINE PO)  Outside Facility (Specify) Yes No   Sig: Take by mouth   PROLIA 60 MG/ML  Outside Facility (Specify) Yes No   Starch-Maltodextrin (DIAFOODS THICK-IT PO)  Outside Facility (Specify) Yes No   Sig: Take by mouth   acetaminophen (TYLENOL) 325 mg tablet  Outside Facility (Specify) Yes No   Sig: Take 325 mg by mouth every 6 (six) hours as needed for mild pain   calcium polycarbophil (FIBERCON) 625 mg tablet  Outside Facility (Specify) Yes No   Sig: Take 625 mg by mouth daily   carbidopa-levodopa (SINEMET)  mg per tablet  Outside Facility (Specify) Yes No   Sig: Take 1.5 tablets 3 times a day   citalopram (CeleXA) 20 mg tablet  Outside Facility (Specify) Yes No   Sig: Take 20 mg by mouth daily     Patient not taking: Reported on 11/2/2022   finasteride (PROSCAR) 5 mg tablet  Outside Facility (Specify) No No   Sig: Take 1 tablet (5 mg total) by mouth daily   guaiFENesin (ROBITUSSIN) 100 MG/5ML oral liquid Outside Facility ([address] [place] [address]) Yes No   Sig: Take 200 mg by mouth 3 (three) times a day as needed for cough   Patient not taking: Reported on 11/2/2022   loperamide (IMODIUM A-D) 2 MG tablet   Yes No   Sig: Take by mouth 4 (four) times a day as needed for diarrhea     Patient not taking: Reported on 11/2/2022   melatonin 3 mg   Yes No   Sig: Take 3 mg by mouth daily at bedtime   meloxicam (MOBIC) 7.5 mg tablet   Yes No   Sig: Take 7.5 mg by mouth daily    Patient not taking: No sig reported   nystatin (MYCOSTATIN) cream   Yes No   ofloxacin (OCUFLOX) 0.3 % ophthalmic solution  Outside Facility (Specify) Yes No   oxybutynin (DITROPAN) 5 mg tablet   Yes No   Sig: Take 5 mg by mouth 2 (two) times a day     Patient not taking: No sig reported   risperiDONE (RisperDAL) 1 mg tablet  Outside Facility (Specify) Yes No   Sig: Take 0.5 mg by mouth 2 (two) times a day     Patient not taking: Reported on 11/2/2022   terazosin (HYTRIN) 5 mg capsule  Outside Facility (Specify) Yes No   Sig: Take 5 mg by mouth daily at bedtime   terbinafine (LamISIL) 1 % cream  Outside Facility (Specify) Yes No   Sig: terbinafine HCl 1 % topical cream      Facility-Administered Medications: None       Past Medical History:   Diagnosis Date   • Developmental non-verbal disorder    • Dysphagia     moderate pharyngeal dysphagia   • Gait disturbance    • Hearing loss    • Intellectual disability    • Osteoarthritis    • Osteoporosis    • Prostate atrophy        Past Surgical History:   Procedure Laterality Date   • US GUIDED INTRAOPERATIVE  2/24/2017       Family History   Family history unknown: Yes     I have reviewed and agree with the history as documented.     E-Cigarette/Vaping     E-Cigarette/Vaping Substances     Social History     Tobacco Use   • Smoking status: Never   • Smokeless tobacco: Never   Substance Use Topics   • Alcohol use: No   • Drug use: No       Review of Systems   Unable to perform ROS: Other (MR, patient at baseline) Physical Exam  Physical Exam  Vitals and nursing note reviewed. Constitutional:       General: He is not in acute distress. Appearance: Normal appearance. He is well-developed and normal weight. He is not ill-appearing or toxic-appearing. HENT:      Head: Normocephalic. Comments: Small laceration noted to the bridge of the nose, approximately half a centimeter. Wound is hemostatic. Right Ear: External ear normal.      Left Ear: External ear normal.      Nose: Nose normal. No congestion or rhinorrhea. Mouth/Throat:      Mouth: Mucous membranes are moist.      Pharynx: Oropharynx is clear. Eyes:      General: No scleral icterus. Conjunctiva/sclera: Conjunctivae normal.   Cardiovascular:      Rate and Rhythm: Normal rate and regular rhythm. Pulses: Normal pulses. Heart sounds: Normal heart sounds. No murmur heard. Pulmonary:      Effort: Pulmonary effort is normal. No respiratory distress. Breath sounds: Normal breath sounds. No wheezing or rales. Abdominal:      General: Abdomen is flat. Bowel sounds are normal. There is no distension. Palpations: Abdomen is soft. There is no mass. Tenderness: There is no abdominal tenderness. Musculoskeletal:         General: Normal range of motion. Cervical back: Normal range of motion and neck supple. No tenderness. Right lower leg: No edema. Left lower leg: No edema. Skin:     General: Skin is warm and dry. Capillary Refill: Capillary refill takes less than 2 seconds. Neurological:      General: No focal deficit present. Mental Status: He is alert. Mental status is at baseline. He is disoriented. Motor: No weakness.    Psychiatric:      Comments: Unable to test due to mental status         Vital Signs  ED Triage Vitals   Temperature Pulse Respirations Blood Pressure SpO2   07/30/23 1930 07/30/23 1930 07/30/23 1930 07/30/23 1932 07/30/23 1930   98.3 °F (36.8 °C) 67 18 117/68 96 % Temp Source Heart Rate Source Patient Position - Orthostatic VS BP Location FiO2 (%)   07/30/23 1930 07/30/23 1930 07/30/23 1930 07/30/23 1930 --   Oral Monitor Lying Right arm       Pain Score       --                  Vitals:    07/30/23 1930 07/30/23 1932   BP:  117/68   Pulse: 67    Patient Position - Orthostatic VS: Lying          Visual Acuity      ED Medications  Medications - No data to display    Diagnostic Studies  Results Reviewed     None                 CT cervical spine without contrast   Final Result by Vee Coley MD (07/30 2259)      Stable degenerative changes and chronic compression deformity of the C6 vertebrae again seen. No acute cervical spine fracture or traumatic malalignment. Workstation performed: [id number]         CT head without contrast   Final Result by Vee Coley MD (07/30 2250)      No acute intracranial abnormality. Workstation performed: [id number]         CT facial bones without contrast   Final Result by Vee Coley MD (07/30 2306)      No acute maxillofacial bone, orbital, or mandible fracture identified. Workstation performed: [id number]                    Procedures  Laceration repair    Date/Time: 7/31/2023 12:07 AM    Performed by: Harles Brittle, MD  Authorized by: Harles Brittle, MD  Body area: head/neck  Location details: nose  Laceration length: 1 cm      Procedure Details:  Comments: Skin glue               ED Course  ED Course as of 07/31/23 0009   Chrissy Magdaleno Jul 30, 2023 2132 Last dtap 10/27/2020                                             Medical Decision Making  This is a 78-year-old male patient presenting to the ED for complaint of a fall. Patient fell out of his wheelchair, and struck his face. He did not lose consciousness, he is not on any antiplatelet medications or anticoagulation. He is acting normally per [facility] skilled nursing facility staff.   He has a small laceration on the bridge of his nose, without any other significant abnormalities. His differential diagnosis includes: Facial fracture versus intracranial bleed versus closed head injury versus simple laceration versus other. CTs of the head, C-spine, face showed no significant acute abnormalities. Patient was diagnosed with closed head injury, as well as a facial laceration. Facial laceration closed with skin glue. Patient was cleared to go back to his skilled nursing facility. Amount and/or Complexity of Data Reviewed  Radiology: ordered. Disposition  Final diagnoses:   Closed head injury, initial encounter   Facial laceration, initial encounter     Time reflects when diagnosis was documented in both MDM as applicable and the Disposition within this note     Time User Action Codes Description Comment    7/30/2023 11:21 PM Stefania Culver, [address] [S09.90XA] Closed head injury, initial encounter     7/30/2023 11:21 PM [address]in [id number] Facial laceration, initial encounter       ED Disposition     ED Disposition   Discharge    Condition   Stable    Date/Time   Sun Jul 30, 2023 11:21 PM    Comment   Luzmaria Jerome discharge to home/self care. Follow-up Information     Follow up With Specialties Details Why Contact Info    Butch Martinez MD Family Medicine Call in 1 day  [address] [place] [address] [phone]            Patient's Medications   Discharge Prescriptions    No medications on file       No discharge procedures on file.     PDMP Review       Value Time User    PDMP Reviewed  Yes 5/17/2020  7:00 AM Anoop Horan MD          ED Provider  Electronically Signed by           Cristina Veras MD  07/31/23 5900

## 2023-08-10 ENCOUNTER — REMOTE DEVICE CLINIC VISIT (OUTPATIENT)
Dept: CARDIOLOGY CLINIC | Facility: CLINIC | Age: 73
End: 2023-08-10
Payer: MEDICARE

## 2023-08-10 DIAGNOSIS — Z95.0 CARDIAC PACEMAKER IN SITU: Primary | ICD-10-CM

## 2023-08-10 PROCEDURE — 93296 REM INTERROG EVL PM/IDS: CPT | Performed by: INTERNAL MEDICINE

## 2023-08-10 PROCEDURE — 93294 REM INTERROG EVL PM/LDLS PM: CPT | Performed by: INTERNAL MEDICINE

## 2023-08-10 NOTE — PROGRESS NOTES
Results for orders placed or performed in visit on 08/10/23   Cardiac EP device report    Narrative    MDT-DUAL CHAMBER PPM/ACTIVE SYSTEM IS MRI CONDITIONAL  CARELINK TRANSMISSION: BATTERY VOLTAGE ADEQUATE (7.0 YRS). AP 1.8%   100% (CHB/DDD 60PPM); ALL AVAILABLE LEAD PARAMETERS WITHIN NORMAL LIMITS. NO SIGNIFICANT HIGH RATE EPISODES. NORMAL DEVICE FUNCTION.   ES

## 2023-11-07 ENCOUNTER — IN-CLINIC DEVICE VISIT (OUTPATIENT)
Dept: CARDIOLOGY CLINIC | Facility: CLINIC | Age: 73
End: 2023-11-07
Payer: MEDICARE

## 2023-11-07 DIAGNOSIS — Z95.0 CARDIAC PACEMAKER IN SITU: Primary | ICD-10-CM

## 2023-11-07 PROCEDURE — 93280 PM DEVICE PROGR EVAL DUAL: CPT | Performed by: INTERNAL MEDICINE

## 2023-11-07 NOTE — PROGRESS NOTES
Results for orders placed or performed in visit on 11/07/23   Cardiac EP device report    Narrative    MDT-DUAL CHAMBER PPM/ACTIVE SYSTEM IS MRI CONDITIONAL  [facility]. BATTERY VOLTAGE ADEQUATE (6.6 YRS). AP-1%, -100% (>40% [email or web address] OFF). ALL LEAD PARAMETERS WITHIN NORMAL LIMITS. NO NEW SIGNIFICANT HIGH RATE EPISODES. PT IN ATACH. PT IS NONVERBAL & DOES NOT APPEAR IN ANY DISTRESS. NORMAL DEVICE FUNCTION.  GV

## 2023-11-15 ENCOUNTER — REMOTE DEVICE CLINIC VISIT (OUTPATIENT)
Dept: CARDIOLOGY CLINIC | Facility: CLINIC | Age: 73
End: 2023-11-15

## 2023-11-15 DIAGNOSIS — Z95.0 PRESENCE OF PERMANENT CARDIAC PACEMAKER: Primary | ICD-10-CM

## 2023-11-15 PROCEDURE — RECHECK: Performed by: INTERNAL MEDICINE

## 2023-11-15 NOTE — PROGRESS NOTES
MDT-DUAL CHAMBER PPM/ACTIVE SYSTEM IS MRI CONDITIONAL   NB CARELINK TRANSMISSION:  EPISODE CHECK. BATTERY VOLTAGE ADEQUATE (6.5 YR.). AP 2.1% -% (>40%/CHB/DDD 60 PPM, -180 MS). ALL LEAD PARAMETERS WITHIN NORMAL LIMITS. NO SIGNIFICANT HIGH RATE EPISODES. CURRENT EGM SHOWS AS/ 120 BPM WITH BLOCK (KRP=231 BPM). NORMAL DEVICE FUNCTION.   RG

## 2024-01-25 LAB — HBA1C MFR BLD HPLC: 5.5 %

## 2024-02-09 ENCOUNTER — REMOTE DEVICE CLINIC VISIT (OUTPATIENT)
Dept: CARDIOLOGY CLINIC | Facility: CLINIC | Age: 74
End: 2024-02-09
Payer: MEDICARE

## 2024-02-09 DIAGNOSIS — Z95.0 PRESENCE OF PERMANENT CARDIAC PACEMAKER: Primary | ICD-10-CM

## 2024-02-09 PROCEDURE — 93294 REM INTERROG EVL PM/LDLS PM: CPT | Performed by: INTERNAL MEDICINE

## 2024-02-09 PROCEDURE — 93296 REM INTERROG EVL PM/IDS: CPT | Performed by: INTERNAL MEDICINE

## 2024-02-09 NOTE — PROGRESS NOTES
Results for orders placed or performed in visit on 02/09/24   Cardiac EP device report    Narrative    MDT-DUAL CHAMBER PPM/ACTIVE SYSTEM IS MRI CONDITIONAL  CARELINK TRANSMISSION: BATTERY VOLTAGE ADEQUATE (6.2 YRS). AP 1.7%  % (>40%/CHB/DDD 60 PPM); ALL AVAILABLE LEAD PARAMETERS WITHIN NORMAL LIMITS. NO SIGNIFICANT HIGH RATE EPISODES. NORMAL DEVICE FUNCTION.  ES

## 2024-05-10 ENCOUNTER — REMOTE DEVICE CLINIC VISIT (OUTPATIENT)
Dept: CARDIOLOGY CLINIC | Facility: CLINIC | Age: 74
End: 2024-05-10
Payer: MEDICARE

## 2024-05-10 DIAGNOSIS — Z95.0 PRESENCE OF PERMANENT CARDIAC PACEMAKER: Primary | ICD-10-CM

## 2024-05-10 PROCEDURE — 93296 REM INTERROG EVL PM/IDS: CPT | Performed by: STUDENT IN AN ORGANIZED HEALTH CARE EDUCATION/TRAINING PROGRAM

## 2024-05-10 PROCEDURE — 93294 REM INTERROG EVL PM/LDLS PM: CPT | Performed by: STUDENT IN AN ORGANIZED HEALTH CARE EDUCATION/TRAINING PROGRAM

## 2024-05-10 NOTE — PROGRESS NOTES
MDT-DUAL CHAMBER PPM/ACTIVE SYSTEM IS MRI CONDITIONAL   CARELINK TRANSMISSION: BATTERY VOLTAGE ADEQUATE (5.8 YRS). AP 1.9%  99.9% (>40%/CHB/DDD 60 PPM/LFF -/150 MS ); ALL AVAILABLE LEAD PARAMETERS WITHIN NORMAL LIMITS. NO SIGNIFICANT HIGH RATE EPISODES. NORMAL DEVICE FUNCTION.  ES

## 2024-08-08 NOTE — ASSESSMENT & PLAN NOTE
-- DO NOT REPLY / DO NOT REPLY ALL --  -- This inbox is not monitored. If this was sent to the wrong provider or department, reroute message to  Purchasing Platformoute pool. --  -- Message is from Engagement Center Operations ([facility]) --    General Patient Message: Patient is calling to find out if there is a generic form of  iloxazine HCl ER (Qelbree) 150 MG; this one is too expensive at $691.  Please contact and advise.     Caller Information         Type Contact Phone/Fax    08/08/2024 01:12 PM CDT Phone (Incoming) Husam Gill (Self) [phone] (M)            Alternative phone number: No     Can a detailed message be left? Yes - Voicemail      Patient has been advised the message will be addressed within 2-3 business days.             · S/P PPM yesterday evening  · Pacemaker interrogation performed  · CXR this AM- await radiology read    · Cardio to re-eval this AM  · Needs [facility] with nursing for group home and 24 hrs post procedure prior to d/c

## 2024-11-12 ENCOUNTER — IN-CLINIC DEVICE VISIT (OUTPATIENT)
Dept: CARDIOLOGY CLINIC | Facility: CLINIC | Age: 74
End: 2024-11-12
Payer: MEDICARE

## 2024-11-12 DIAGNOSIS — Z95.0 PRESENCE OF CARDIAC PACEMAKER: ICD-10-CM

## 2024-11-12 DIAGNOSIS — I44.2 THIRD DEGREE HEART BLOCK (HCC): Primary | ICD-10-CM

## 2024-11-12 PROCEDURE — 93280 PM DEVICE PROGR EVAL DUAL: CPT | Performed by: INTERNAL MEDICINE

## 2024-11-13 NOTE — PROGRESS NOTES
Results for orders placed or performed in visit on 11/12/24   Cardiac EP device report    Narrative    MDT-DUAL CHAMBER PPM/ACTIVE SYSTEM IS MRI CONDITIONAL  DEVICE INTERROGATED IN THE [facility] OFFICE. BATTERY VOLTAGE ADEQUATE (5.3 YRS). AP 4.6%  99.9% (>40% CHB/DDD 60 BPM).  ALL AVAILABLE LEAD PARAMETERS WITHIN NORMAL LIMITS.  5 DEVICE CLASSIFIED AT/AF EPISODES, EGM'S PAT VS PAF.  LONGEST DURATION 3 MINS, 7 SECS.  PT NOT ON ANY A/C OR BBLOCKERS.  EF 60% (ECHO 1/19/2018).  TIME IN AT/AF <0.1%.  NO PROGRAMMING CHANGES MADE TO DEVICE PARAMETERS.  NORMAL DEVICE FUNCTION. PAS

## 2024-11-16 ENCOUNTER — RESULTS FOLLOW-UP (OUTPATIENT)
Dept: CARDIOLOGY CLINIC | Facility: CLINIC | Age: 74
End: 2024-11-16